# Patient Record
Sex: FEMALE | Race: WHITE | Employment: FULL TIME | URBAN - METROPOLITAN AREA
[De-identification: names, ages, dates, MRNs, and addresses within clinical notes are randomized per-mention and may not be internally consistent; named-entity substitution may affect disease eponyms.]

---

## 2017-01-02 RX ORDER — AMLODIPINE BESYLATE 5 MG/1
5 TABLET ORAL DAILY
Qty: 90 TABLET | Refills: 1 | Status: SHIPPED | OUTPATIENT
Start: 2017-01-02 | End: 2017-06-22 | Stop reason: SDUPTHER

## 2017-01-20 ENCOUNTER — TELEPHONE (OUTPATIENT)
Dept: INTERNAL MEDICINE CLINIC | Age: 52
End: 2017-01-20

## 2017-01-24 ENCOUNTER — TELEPHONE (OUTPATIENT)
Dept: INTERNAL MEDICINE CLINIC | Age: 52
End: 2017-01-24

## 2017-01-26 ENCOUNTER — OFFICE VISIT (OUTPATIENT)
Dept: INTERNAL MEDICINE CLINIC | Age: 52
End: 2017-01-26

## 2017-01-26 VITALS
BODY MASS INDEX: 29.19 KG/M2 | WEIGHT: 192.6 LBS | DIASTOLIC BLOOD PRESSURE: 80 MMHG | RESPIRATION RATE: 16 BRPM | HEIGHT: 68 IN | HEART RATE: 76 BPM | SYSTOLIC BLOOD PRESSURE: 138 MMHG

## 2017-01-26 DIAGNOSIS — R55 SYNCOPE, UNSPECIFIED SYNCOPE TYPE: Primary | ICD-10-CM

## 2017-01-26 PROCEDURE — 99213 OFFICE O/P EST LOW 20 MIN: CPT | Performed by: INTERNAL MEDICINE

## 2017-01-26 PROCEDURE — 93000 ELECTROCARDIOGRAM COMPLETE: CPT | Performed by: INTERNAL MEDICINE

## 2017-02-17 ENCOUNTER — HOSPITAL ENCOUNTER (OUTPATIENT)
Dept: NUCLEAR MEDICINE | Age: 52
Discharge: HOME | End: 2017-02-17
Attending: INTERNAL MEDICINE | Admitting: INTERNAL MEDICINE

## 2017-02-17 DIAGNOSIS — C50.919 MALIGNANT NEOPLASM OF BREAST (FEMALE), UNSPECIFIED SITE: ICD-10-CM

## 2017-02-17 DIAGNOSIS — M25.50 PAIN IN JOINT: ICD-10-CM

## 2017-02-17 RX ORDER — TC 99M MEDRONATE 20 MG/10ML
25 INJECTION, POWDER, LYOPHILIZED, FOR SOLUTION INTRAVENOUS
Status: COMPLETED | OUTPATIENT
Start: 2017-02-17 | End: 2017-02-17

## 2017-02-17 RX ADMIN — TC 99M MEDRONATE 25 MILLICURIE: 20 INJECTION, POWDER, LYOPHILIZED, FOR SOLUTION INTRAVENOUS at 09:18

## 2017-02-22 ENCOUNTER — HOSPITAL ENCOUNTER (OUTPATIENT)
Dept: NON INVASIVE DIAGNOSTICS | Age: 52
Discharge: HOME | End: 2017-02-22
Attending: INTERNAL MEDICINE | Admitting: INTERNAL MEDICINE

## 2017-02-22 DIAGNOSIS — M25.50 PAIN IN JOINT: ICD-10-CM

## 2017-02-22 LAB
LV EF: 55 %
LVEF MODALITY: NORMAL

## 2017-06-19 ENCOUNTER — OFFICE VISIT (OUTPATIENT)
Dept: INTERNAL MEDICINE CLINIC | Age: 52
End: 2017-06-19

## 2017-06-19 VITALS
SYSTOLIC BLOOD PRESSURE: 130 MMHG | DIASTOLIC BLOOD PRESSURE: 90 MMHG | BODY MASS INDEX: 30.31 KG/M2 | WEIGHT: 200 LBS | HEIGHT: 68 IN | OXYGEN SATURATION: 98 % | HEART RATE: 83 BPM

## 2017-06-19 DIAGNOSIS — A09 TRAVELER'S DIARRHEA: Primary | ICD-10-CM

## 2017-06-19 PROCEDURE — 99213 OFFICE O/P EST LOW 20 MIN: CPT | Performed by: INTERNAL MEDICINE

## 2017-06-19 RX ORDER — CIPROFLOXACIN 500 MG/1
500 TABLET, FILM COATED ORAL 2 TIMES DAILY
Qty: 6 TABLET | Refills: 0 | Status: SHIPPED | OUTPATIENT
Start: 2017-06-19 | End: 2017-06-22

## 2017-06-19 ASSESSMENT — ENCOUNTER SYMPTOMS
NAUSEA: 1
DIARRHEA: 1
EYES NEGATIVE: 1
RESPIRATORY NEGATIVE: 1
CONSTIPATION: 0
VOMITING: 0
BLOOD IN STOOL: 0
HEARTBURN: 0
ABDOMINAL PAIN: 1

## 2017-06-22 RX ORDER — AMLODIPINE BESYLATE 5 MG/1
TABLET ORAL
Qty: 90 TABLET | Refills: 0 | Status: SHIPPED | OUTPATIENT
Start: 2017-06-22 | End: 2017-11-07 | Stop reason: SDUPTHER

## 2017-11-07 ENCOUNTER — OFFICE VISIT (OUTPATIENT)
Dept: INTERNAL MEDICINE CLINIC | Age: 52
End: 2017-11-07

## 2017-11-07 VITALS
HEIGHT: 68 IN | DIASTOLIC BLOOD PRESSURE: 82 MMHG | RESPIRATION RATE: 16 BRPM | BODY MASS INDEX: 31.01 KG/M2 | HEART RATE: 82 BPM | SYSTOLIC BLOOD PRESSURE: 122 MMHG | WEIGHT: 204.6 LBS

## 2017-11-07 DIAGNOSIS — I10 ESSENTIAL HYPERTENSION: Primary | ICD-10-CM

## 2017-11-07 DIAGNOSIS — Z23 NEED FOR INFLUENZA VACCINATION: ICD-10-CM

## 2017-11-07 DIAGNOSIS — E78.00 PURE HYPERCHOLESTEROLEMIA: ICD-10-CM

## 2017-11-07 PROCEDURE — 90686 IIV4 VACC NO PRSV 0.5 ML IM: CPT | Performed by: INTERNAL MEDICINE

## 2017-11-07 PROCEDURE — 90471 IMMUNIZATION ADMIN: CPT | Performed by: INTERNAL MEDICINE

## 2017-11-07 PROCEDURE — 99213 OFFICE O/P EST LOW 20 MIN: CPT | Performed by: INTERNAL MEDICINE

## 2017-11-07 RX ORDER — FLUOXETINE HYDROCHLORIDE 20 MG/1
20 CAPSULE ORAL DAILY
Qty: 90 CAPSULE | Refills: 3 | Status: SHIPPED | OUTPATIENT
Start: 2017-11-07 | End: 2018-04-27 | Stop reason: SDUPTHER

## 2017-11-07 RX ORDER — AMLODIPINE BESYLATE 5 MG/1
TABLET ORAL
Qty: 90 TABLET | Refills: 3 | Status: SHIPPED | OUTPATIENT
Start: 2017-11-07 | End: 2018-01-31

## 2017-11-07 RX ORDER — AMLODIPINE BESYLATE 5 MG/1
TABLET ORAL
Qty: 30 TABLET | Refills: 0 | Status: SHIPPED | OUTPATIENT
Start: 2017-11-07 | End: 2017-11-07 | Stop reason: SDUPTHER

## 2017-11-24 ENCOUNTER — TELEPHONE (OUTPATIENT)
Dept: INTERNAL MEDICINE CLINIC | Age: 52
End: 2017-11-24

## 2017-11-24 DIAGNOSIS — I10 ESSENTIAL HYPERTENSION: Primary | ICD-10-CM

## 2017-11-24 DIAGNOSIS — E78.00 PURE HYPERCHOLESTEROLEMIA: ICD-10-CM

## 2017-12-01 LAB
A/G RATIO: 1.8 (ref 1.1–2.2)
ALBUMIN SERPL-MCNC: 4.5 G/DL (ref 3.4–5)
ALP BLD-CCNC: 88 U/L (ref 40–129)
ALT SERPL-CCNC: 25 U/L (ref 10–40)
ANION GAP SERPL CALCULATED.3IONS-SCNC: 13 MMOL/L (ref 3–16)
AST SERPL-CCNC: 16 U/L (ref 15–37)
BILIRUB SERPL-MCNC: 0.4 MG/DL (ref 0–1)
BUN BLDV-MCNC: 11 MG/DL (ref 7–20)
CALCIUM SERPL-MCNC: 9.6 MG/DL (ref 8.3–10.6)
CHLORIDE BLD-SCNC: 103 MMOL/L (ref 99–110)
CHOLESTEROL, TOTAL: 275 MG/DL (ref 0–199)
CO2: 26 MMOL/L (ref 21–32)
CREAT SERPL-MCNC: 0.6 MG/DL (ref 0.6–1.1)
GFR AFRICAN AMERICAN: >60
GFR NON-AFRICAN AMERICAN: >60
GLOBULIN: 2.5 G/DL
GLUCOSE BLD-MCNC: 93 MG/DL (ref 70–99)
HDLC SERPL-MCNC: 68 MG/DL (ref 40–60)
LDL CHOLESTEROL CALCULATED: 184 MG/DL
POTASSIUM SERPL-SCNC: 4.3 MMOL/L (ref 3.5–5.1)
SODIUM BLD-SCNC: 142 MMOL/L (ref 136–145)
TOTAL PROTEIN: 7 G/DL (ref 6.4–8.2)
TRIGL SERPL-MCNC: 116 MG/DL (ref 0–150)
TSH SERPL DL<=0.05 MIU/L-ACNC: 1.26 UIU/ML (ref 0.27–4.2)
VLDLC SERPL CALC-MCNC: 23 MG/DL

## 2018-02-22 RX ORDER — AMLODIPINE BESYLATE 5 MG/1
5 TABLET ORAL DAILY
Qty: 90 TABLET | Refills: 1 | Status: SHIPPED | OUTPATIENT
Start: 2018-02-22 | End: 2018-12-17 | Stop reason: SDUPTHER

## 2018-02-23 ENCOUNTER — OFFICE VISIT (OUTPATIENT)
Dept: INTERNAL MEDICINE CLINIC | Age: 53
End: 2018-02-23

## 2018-02-23 VITALS
TEMPERATURE: 98.8 F | RESPIRATION RATE: 16 BRPM | DIASTOLIC BLOOD PRESSURE: 80 MMHG | WEIGHT: 198.2 LBS | HEART RATE: 100 BPM | HEIGHT: 68 IN | SYSTOLIC BLOOD PRESSURE: 130 MMHG | BODY MASS INDEX: 30.04 KG/M2

## 2018-02-23 DIAGNOSIS — J40 BRONCHITIS: Primary | ICD-10-CM

## 2018-02-23 PROCEDURE — 99213 OFFICE O/P EST LOW 20 MIN: CPT | Performed by: INTERNAL MEDICINE

## 2018-02-23 RX ORDER — AZITHROMYCIN 250 MG/1
TABLET, FILM COATED ORAL
Qty: 1 PACKET | Refills: 0 | Status: SHIPPED | OUTPATIENT
Start: 2018-02-23 | End: 2018-03-05

## 2018-02-23 RX ORDER — GUAIFENESIN AND CODEINE PHOSPHATE 100; 10 MG/5ML; MG/5ML
5-10 SOLUTION ORAL EVERY 4 HOURS PRN
Qty: 180 ML | Refills: 1 | Status: SHIPPED | OUTPATIENT
Start: 2018-02-23 | End: 2018-04-27

## 2018-04-19 ENCOUNTER — TELEPHONE (OUTPATIENT)
Dept: INTERNAL MEDICINE CLINIC | Age: 53
End: 2018-04-19

## 2018-04-27 ENCOUNTER — OFFICE VISIT (OUTPATIENT)
Dept: PSYCHOLOGY | Age: 53
End: 2018-04-27

## 2018-04-27 DIAGNOSIS — F41.9 ANXIETY: Primary | ICD-10-CM

## 2018-04-27 PROCEDURE — 90791 PSYCH DIAGNOSTIC EVALUATION: CPT | Performed by: PSYCHOLOGIST

## 2018-04-27 RX ORDER — FLUOXETINE HYDROCHLORIDE 40 MG/1
40 CAPSULE ORAL DAILY
Qty: 30 CAPSULE | Refills: 1 | Status: SHIPPED | OUTPATIENT
Start: 2018-04-27 | End: 2018-06-27 | Stop reason: SDUPTHER

## 2018-04-27 ASSESSMENT — PATIENT HEALTH QUESTIONNAIRE - PHQ9
3. TROUBLE FALLING OR STAYING ASLEEP: 3
1. LITTLE INTEREST OR PLEASURE IN DOING THINGS: 2
SUM OF ALL RESPONSES TO PHQ9 QUESTIONS 1 & 2: 4
10. IF YOU CHECKED OFF ANY PROBLEMS, HOW DIFFICULT HAVE THESE PROBLEMS MADE IT FOR YOU TO DO YOUR WORK, TAKE CARE OF THINGS AT HOME, OR GET ALONG WITH OTHER PEOPLE: 1
8. MOVING OR SPEAKING SO SLOWLY THAT OTHER PEOPLE COULD HAVE NOTICED. OR THE OPPOSITE, BEING SO FIGETY OR RESTLESS THAT YOU HAVE BEEN MOVING AROUND A LOT MORE THAN USUAL: 0
2. FEELING DOWN, DEPRESSED OR HOPELESS: 2
5. POOR APPETITE OR OVEREATING: 1
6. FEELING BAD ABOUT YOURSELF - OR THAT YOU ARE A FAILURE OR HAVE LET YOURSELF OR YOUR FAMILY DOWN: 3
4. FEELING TIRED OR HAVING LITTLE ENERGY: 3
SUM OF ALL RESPONSES TO PHQ QUESTIONS 1-9: 16
9. THOUGHTS THAT YOU WOULD BE BETTER OFF DEAD, OR OF HURTING YOURSELF: 0
7. TROUBLE CONCENTRATING ON THINGS, SUCH AS READING THE NEWSPAPER OR WATCHING TELEVISION: 2

## 2018-04-27 ASSESSMENT — ANXIETY QUESTIONNAIRES
2. NOT BEING ABLE TO STOP OR CONTROL WORRYING: 3-NEARLY EVERY DAY
7. FEELING AFRAID AS IF SOMETHING AWFUL MIGHT HAPPEN: 1-SEVERAL DAYS
4. TROUBLE RELAXING: 3-NEARLY EVERY DAY
1. FEELING NERVOUS, ANXIOUS, OR ON EDGE: 3-NEARLY EVERY DAY
6. BECOMING EASILY ANNOYED OR IRRITABLE: 1-SEVERAL DAYS
3. WORRYING TOO MUCH ABOUT DIFFERENT THINGS: 3-NEARLY EVERY DAY
5. BEING SO RESTLESS THAT IT IS HARD TO SIT STILL: 1-SEVERAL DAYS
GAD7 TOTAL SCORE: 15

## 2018-05-01 ENCOUNTER — OFFICE VISIT (OUTPATIENT)
Dept: PSYCHOLOGY | Age: 53
End: 2018-05-01

## 2018-05-01 DIAGNOSIS — F41.9 ANXIETY: Primary | ICD-10-CM

## 2018-05-01 PROCEDURE — 90832 PSYTX W PT 30 MINUTES: CPT | Performed by: PSYCHOLOGIST

## 2018-05-01 ASSESSMENT — ANXIETY QUESTIONNAIRES
6. BECOMING EASILY ANNOYED OR IRRITABLE: 2-OVER HALF THE DAYS
3. WORRYING TOO MUCH ABOUT DIFFERENT THINGS: 3-NEARLY EVERY DAY
7. FEELING AFRAID AS IF SOMETHING AWFUL MIGHT HAPPEN: 1-SEVERAL DAYS
1. FEELING NERVOUS, ANXIOUS, OR ON EDGE: 2-OVER HALF THE DAYS
4. TROUBLE RELAXING: 3-NEARLY EVERY DAY
2. NOT BEING ABLE TO STOP OR CONTROL WORRYING: 3-NEARLY EVERY DAY
GAD7 TOTAL SCORE: 15
5. BEING SO RESTLESS THAT IT IS HARD TO SIT STILL: 1-SEVERAL DAYS

## 2018-05-01 ASSESSMENT — PATIENT HEALTH QUESTIONNAIRE - PHQ9
7. TROUBLE CONCENTRATING ON THINGS, SUCH AS READING THE NEWSPAPER OR WATCHING TELEVISION: 1
10. IF YOU CHECKED OFF ANY PROBLEMS, HOW DIFFICULT HAVE THESE PROBLEMS MADE IT FOR YOU TO DO YOUR WORK, TAKE CARE OF THINGS AT HOME, OR GET ALONG WITH OTHER PEOPLE: 1
9. THOUGHTS THAT YOU WOULD BE BETTER OFF DEAD, OR OF HURTING YOURSELF: 0
1. LITTLE INTEREST OR PLEASURE IN DOING THINGS: 2
SUM OF ALL RESPONSES TO PHQ9 QUESTIONS 1 & 2: 4
6. FEELING BAD ABOUT YOURSELF - OR THAT YOU ARE A FAILURE OR HAVE LET YOURSELF OR YOUR FAMILY DOWN: 2
SUM OF ALL RESPONSES TO PHQ QUESTIONS 1-9: 11
2. FEELING DOWN, DEPRESSED OR HOPELESS: 2
4. FEELING TIRED OR HAVING LITTLE ENERGY: 1
3. TROUBLE FALLING OR STAYING ASLEEP: 2
5. POOR APPETITE OR OVEREATING: 1
8. MOVING OR SPEAKING SO SLOWLY THAT OTHER PEOPLE COULD HAVE NOTICED. OR THE OPPOSITE, BEING SO FIGETY OR RESTLESS THAT YOU HAVE BEEN MOVING AROUND A LOT MORE THAN USUAL: 0

## 2018-05-18 ENCOUNTER — OFFICE VISIT (OUTPATIENT)
Dept: PSYCHOLOGY | Age: 53
End: 2018-05-18

## 2018-05-18 DIAGNOSIS — F34.1 PERSISTENT DEPRESSIVE DISORDER: ICD-10-CM

## 2018-05-18 DIAGNOSIS — F41.1 GAD (GENERALIZED ANXIETY DISORDER): Primary | ICD-10-CM

## 2018-05-18 PROCEDURE — 90834 PSYTX W PT 45 MINUTES: CPT | Performed by: PSYCHOLOGIST

## 2018-05-18 ASSESSMENT — ANXIETY QUESTIONNAIRES
6. BECOMING EASILY ANNOYED OR IRRITABLE: 1-SEVERAL DAYS
1. FEELING NERVOUS, ANXIOUS, OR ON EDGE: 2-OVER HALF THE DAYS
5. BEING SO RESTLESS THAT IT IS HARD TO SIT STILL: 0-NOT AT ALL SURE
7. FEELING AFRAID AS IF SOMETHING AWFUL MIGHT HAPPEN: 1-SEVERAL DAYS
GAD7 TOTAL SCORE: 12
4. TROUBLE RELAXING: 2-OVER HALF THE DAYS
3. WORRYING TOO MUCH ABOUT DIFFERENT THINGS: 3-NEARLY EVERY DAY
2. NOT BEING ABLE TO STOP OR CONTROL WORRYING: 3-NEARLY EVERY DAY

## 2018-05-18 ASSESSMENT — PATIENT HEALTH QUESTIONNAIRE - PHQ9
10. IF YOU CHECKED OFF ANY PROBLEMS, HOW DIFFICULT HAVE THESE PROBLEMS MADE IT FOR YOU TO DO YOUR WORK, TAKE CARE OF THINGS AT HOME, OR GET ALONG WITH OTHER PEOPLE: 1
8. MOVING OR SPEAKING SO SLOWLY THAT OTHER PEOPLE COULD HAVE NOTICED. OR THE OPPOSITE, BEING SO FIGETY OR RESTLESS THAT YOU HAVE BEEN MOVING AROUND A LOT MORE THAN USUAL: 0
SUM OF ALL RESPONSES TO PHQ9 QUESTIONS 1 & 2: 2
SUM OF ALL RESPONSES TO PHQ QUESTIONS 1-9: 9
4. FEELING TIRED OR HAVING LITTLE ENERGY: 2
5. POOR APPETITE OR OVEREATING: 1
2. FEELING DOWN, DEPRESSED OR HOPELESS: 1
1. LITTLE INTEREST OR PLEASURE IN DOING THINGS: 1
3. TROUBLE FALLING OR STAYING ASLEEP: 2
6. FEELING BAD ABOUT YOURSELF - OR THAT YOU ARE A FAILURE OR HAVE LET YOURSELF OR YOUR FAMILY DOWN: 1
7. TROUBLE CONCENTRATING ON THINGS, SUCH AS READING THE NEWSPAPER OR WATCHING TELEVISION: 1
9. THOUGHTS THAT YOU WOULD BE BETTER OFF DEAD, OR OF HURTING YOURSELF: 0

## 2018-05-22 ENCOUNTER — OFFICE VISIT (OUTPATIENT)
Dept: PSYCHOLOGY | Age: 53
End: 2018-05-22

## 2018-05-22 DIAGNOSIS — F41.1 GAD (GENERALIZED ANXIETY DISORDER): Primary | ICD-10-CM

## 2018-05-22 PROCEDURE — 90832 PSYTX W PT 30 MINUTES: CPT | Performed by: PSYCHOLOGIST

## 2018-05-22 ASSESSMENT — ANXIETY QUESTIONNAIRES
3. WORRYING TOO MUCH ABOUT DIFFERENT THINGS: 3-NEARLY EVERY DAY
2. NOT BEING ABLE TO STOP OR CONTROL WORRYING: 2-OVER HALF THE DAYS
1. FEELING NERVOUS, ANXIOUS, OR ON EDGE: 1-SEVERAL DAYS
5. BEING SO RESTLESS THAT IT IS HARD TO SIT STILL: 1-SEVERAL DAYS
6. BECOMING EASILY ANNOYED OR IRRITABLE: 1-SEVERAL DAYS
GAD7 TOTAL SCORE: 10
4. TROUBLE RELAXING: 1-SEVERAL DAYS
7. FEELING AFRAID AS IF SOMETHING AWFUL MIGHT HAPPEN: 1-SEVERAL DAYS

## 2018-05-22 ASSESSMENT — PATIENT HEALTH QUESTIONNAIRE - PHQ9
SUM OF ALL RESPONSES TO PHQ9 QUESTIONS 1 & 2: 2
1. LITTLE INTEREST OR PLEASURE IN DOING THINGS: 1
2. FEELING DOWN, DEPRESSED OR HOPELESS: 1
SUM OF ALL RESPONSES TO PHQ QUESTIONS 1-9: 2

## 2018-06-05 ENCOUNTER — OFFICE VISIT (OUTPATIENT)
Dept: PSYCHOLOGY | Age: 53
End: 2018-06-05

## 2018-06-05 DIAGNOSIS — F41.1 GAD (GENERALIZED ANXIETY DISORDER): Primary | ICD-10-CM

## 2018-06-05 PROCEDURE — 90837 PSYTX W PT 60 MINUTES: CPT | Performed by: PSYCHOLOGIST

## 2018-06-05 ASSESSMENT — PATIENT HEALTH QUESTIONNAIRE - PHQ9
2. FEELING DOWN, DEPRESSED OR HOPELESS: 1
SUM OF ALL RESPONSES TO PHQ9 QUESTIONS 1 & 2: 2
SUM OF ALL RESPONSES TO PHQ QUESTIONS 1-9: 2
1. LITTLE INTEREST OR PLEASURE IN DOING THINGS: 1

## 2018-06-05 ASSESSMENT — ANXIETY QUESTIONNAIRES
1. FEELING NERVOUS, ANXIOUS, OR ON EDGE: 1-SEVERAL DAYS
4. TROUBLE RELAXING: 1-SEVERAL DAYS
5. BEING SO RESTLESS THAT IT IS HARD TO SIT STILL: 0-NOT AT ALL SURE
GAD7 TOTAL SCORE: 6
3. WORRYING TOO MUCH ABOUT DIFFERENT THINGS: 2-OVER HALF THE DAYS
7. FEELING AFRAID AS IF SOMETHING AWFUL MIGHT HAPPEN: 0-NOT AT ALL SURE
6. BECOMING EASILY ANNOYED OR IRRITABLE: 0-NOT AT ALL SURE
2. NOT BEING ABLE TO STOP OR CONTROL WORRYING: 2-OVER HALF THE DAYS

## 2018-06-22 ENCOUNTER — OFFICE VISIT (OUTPATIENT)
Dept: PSYCHOLOGY | Age: 53
End: 2018-06-22

## 2018-06-22 DIAGNOSIS — F41.1 GAD (GENERALIZED ANXIETY DISORDER): Primary | ICD-10-CM

## 2018-06-22 PROCEDURE — 90832 PSYTX W PT 30 MINUTES: CPT | Performed by: PSYCHOLOGIST

## 2018-06-22 ASSESSMENT — PATIENT HEALTH QUESTIONNAIRE - PHQ9
SUM OF ALL RESPONSES TO PHQ9 QUESTIONS 1 & 2: 5
10. IF YOU CHECKED OFF ANY PROBLEMS, HOW DIFFICULT HAVE THESE PROBLEMS MADE IT FOR YOU TO DO YOUR WORK, TAKE CARE OF THINGS AT HOME, OR GET ALONG WITH OTHER PEOPLE: 2
6. FEELING BAD ABOUT YOURSELF - OR THAT YOU ARE A FAILURE OR HAVE LET YOURSELF OR YOUR FAMILY DOWN: 2
3. TROUBLE FALLING OR STAYING ASLEEP: 2
7. TROUBLE CONCENTRATING ON THINGS, SUCH AS READING THE NEWSPAPER OR WATCHING TELEVISION: 2
SUM OF ALL RESPONSES TO PHQ QUESTIONS 1-9: 17
4. FEELING TIRED OR HAVING LITTLE ENERGY: 3
8. MOVING OR SPEAKING SO SLOWLY THAT OTHER PEOPLE COULD HAVE NOTICED. OR THE OPPOSITE, BEING SO FIGETY OR RESTLESS THAT YOU HAVE BEEN MOVING AROUND A LOT MORE THAN USUAL: 1
9. THOUGHTS THAT YOU WOULD BE BETTER OFF DEAD, OR OF HURTING YOURSELF: 0
2. FEELING DOWN, DEPRESSED OR HOPELESS: 3
5. POOR APPETITE OR OVEREATING: 2
1. LITTLE INTEREST OR PLEASURE IN DOING THINGS: 2

## 2018-06-22 ASSESSMENT — ANXIETY QUESTIONNAIRES
7. FEELING AFRAID AS IF SOMETHING AWFUL MIGHT HAPPEN: 1-SEVERAL DAYS
5. BEING SO RESTLESS THAT IT IS HARD TO SIT STILL: 2-OVER HALF THE DAYS
2. NOT BEING ABLE TO STOP OR CONTROL WORRYING: 3-NEARLY EVERY DAY
4. TROUBLE RELAXING: 3-NEARLY EVERY DAY
6. BECOMING EASILY ANNOYED OR IRRITABLE: 1-SEVERAL DAYS
3. WORRYING TOO MUCH ABOUT DIFFERENT THINGS: 3-NEARLY EVERY DAY
1. FEELING NERVOUS, ANXIOUS, OR ON EDGE: 3-NEARLY EVERY DAY
GAD7 TOTAL SCORE: 16

## 2018-06-25 ENCOUNTER — OFFICE VISIT (OUTPATIENT)
Dept: PSYCHOLOGY | Age: 53
End: 2018-06-25

## 2018-06-25 DIAGNOSIS — F41.1 GAD (GENERALIZED ANXIETY DISORDER): Primary | ICD-10-CM

## 2018-06-25 PROCEDURE — 90832 PSYTX W PT 30 MINUTES: CPT | Performed by: PSYCHOLOGIST

## 2018-06-27 RX ORDER — FLUOXETINE HYDROCHLORIDE 40 MG/1
CAPSULE ORAL
Qty: 30 CAPSULE | Refills: 0 | Status: SHIPPED | OUTPATIENT
Start: 2018-06-27 | End: 2018-07-26 | Stop reason: SDUPTHER

## 2018-07-02 ENCOUNTER — OFFICE VISIT (OUTPATIENT)
Dept: PSYCHOLOGY | Age: 53
End: 2018-07-02

## 2018-07-02 ENCOUNTER — OFFICE VISIT (OUTPATIENT)
Dept: INTERNAL MEDICINE CLINIC | Age: 53
End: 2018-07-02

## 2018-07-02 VITALS
WEIGHT: 196.8 LBS | RESPIRATION RATE: 16 BRPM | HEART RATE: 80 BPM | BODY MASS INDEX: 29.83 KG/M2 | OXYGEN SATURATION: 98 % | DIASTOLIC BLOOD PRESSURE: 72 MMHG | SYSTOLIC BLOOD PRESSURE: 124 MMHG | HEIGHT: 68 IN

## 2018-07-02 DIAGNOSIS — I10 ESSENTIAL HYPERTENSION: Primary | ICD-10-CM

## 2018-07-02 DIAGNOSIS — F41.1 GAD (GENERALIZED ANXIETY DISORDER): Primary | ICD-10-CM

## 2018-07-02 PROCEDURE — 90832 PSYTX W PT 30 MINUTES: CPT | Performed by: PSYCHOLOGIST

## 2018-07-02 PROCEDURE — 99213 OFFICE O/P EST LOW 20 MIN: CPT | Performed by: INTERNAL MEDICINE

## 2018-07-02 ASSESSMENT — ANXIETY QUESTIONNAIRES
6. BECOMING EASILY ANNOYED OR IRRITABLE: 1-SEVERAL DAYS
2. NOT BEING ABLE TO STOP OR CONTROL WORRYING: 2-OVER HALF THE DAYS
GAD7 TOTAL SCORE: 13
4. TROUBLE RELAXING: 3-NEARLY EVERY DAY
1. FEELING NERVOUS, ANXIOUS, OR ON EDGE: 2-OVER HALF THE DAYS
3. WORRYING TOO MUCH ABOUT DIFFERENT THINGS: 3-NEARLY EVERY DAY
7. FEELING AFRAID AS IF SOMETHING AWFUL MIGHT HAPPEN: 0-NOT AT ALL SURE
5. BEING SO RESTLESS THAT IT IS HARD TO SIT STILL: 2-OVER HALF THE DAYS

## 2018-07-02 ASSESSMENT — PATIENT HEALTH QUESTIONNAIRE - PHQ9
SUM OF ALL RESPONSES TO PHQ9 QUESTIONS 1 & 2: 3
2. FEELING DOWN, DEPRESSED OR HOPELESS: 2
5. POOR APPETITE OR OVEREATING: 1
3. TROUBLE FALLING OR STAYING ASLEEP: 2
1. LITTLE INTEREST OR PLEASURE IN DOING THINGS: 1
7. TROUBLE CONCENTRATING ON THINGS, SUCH AS READING THE NEWSPAPER OR WATCHING TELEVISION: 0
4. FEELING TIRED OR HAVING LITTLE ENERGY: 3
8. MOVING OR SPEAKING SO SLOWLY THAT OTHER PEOPLE COULD HAVE NOTICED. OR THE OPPOSITE, BEING SO FIGETY OR RESTLESS THAT YOU HAVE BEEN MOVING AROUND A LOT MORE THAN USUAL: 0
10. IF YOU CHECKED OFF ANY PROBLEMS, HOW DIFFICULT HAVE THESE PROBLEMS MADE IT FOR YOU TO DO YOUR WORK, TAKE CARE OF THINGS AT HOME, OR GET ALONG WITH OTHER PEOPLE: 1
6. FEELING BAD ABOUT YOURSELF - OR THAT YOU ARE A FAILURE OR HAVE LET YOURSELF OR YOUR FAMILY DOWN: 1
SUM OF ALL RESPONSES TO PHQ QUESTIONS 1-9: 10
9. THOUGHTS THAT YOU WOULD BE BETTER OFF DEAD, OR OF HURTING YOURSELF: 0

## 2018-07-02 NOTE — PROGRESS NOTES
suicidal ideation      History:    Medications:   Current Outpatient Prescriptions   Medication Sig Dispense Refill    FLUoxetine (PROZAC) 40 MG capsule TAKE ONE CAPSULE BY MOUTH DAILY 30 capsule 0    amLODIPine (NORVASC) 5 MG tablet Take 1 tablet by mouth daily 90 tablet 1    VENTOLIN  (90 Base) MCG/ACT inhaler INHALE TWO PUFFS BY MOUTH EVERY 4 HOURS AS NEEDED FOR WHEEZING 1 Inhaler 4    Cholecalciferol (VITAMIN D3) 2000 UNITS CAPS Take 2,000 Units by mouth three times a week      Nutritional Supplements (JUICE PLUS FIBRE PO) Take 1.5 g by mouth daily Indications: Takes Juice Plus      M[place] Mushroom POWD 500 mg by Does not apply route daily      aspirin-acetaminophen-caffeine (EXCEDRIN MIGRAINE) 250-250-65 MG per tablet Take 1 tablet by mouth every 6 hours as needed for Headaches      cetirizine (ZYRTEC) 10 MG tablet Take 10 mg by mouth daily      letrozole (FEMARA) 2.5 MG tablet Take 2.5 mg by mouth daily. 4 times a week      ALPRAZolam (XANAX) 0.5 MG tablet Take 0.5 mg by mouth as needed. No current facility-administered medications for this visit. Social History:   Social History     Social History    Marital status:      Spouse name: N/A    Number of children: 0    Years of education: N/A     Occupational History    Not on file. Social History Main Topics    Smoking status: Never Smoker    Smokeless tobacco: Never Used    Alcohol use Yes      Comment: wine on weekends only    Drug use: No    Sexual activity: Not on file     Other Topics Concern    Not on file     Social History Narrative    No narrative on file       TOBACCO:   reports that she has never smoked. She has never used smokeless tobacco.  ETOH:   reports that she drinks alcohol.     Family History:   Family History   Problem Relation Age of Onset    Other Mother         MVP, Panic Disorder    Coronary Art Dis Father     High Blood Pressure Father     Heart Disease Father 36        MI   Rena Oar Diabetes Father 68        Type 2    Cancer Brother         Melanoma    High Blood Pressure Brother     Cancer Sister         Melanoma in situ    High Blood Pressure Sister     Colon Cancer Paternal Grandmother        A:    See S: above. Stress levels down a bit (see PHQ-9 and BLANCA-7 scores below). Depression levels down from 17 to 10 in the low moderate range. Anxiety levels down from 16 (severe) to 13 in the moderate range. We continue to focus on stress management and the importance of being assertive with  in the house moving process and scheduling fun for herself. Still has plans to go to [facility] with  after closing of house on 7/13. Predicted how grief may worsen once she moves out of home she shared with  and starts living with her mother and step-father. F/u with me next week. PHQ Scores 7/2/2018 6/22/2018 6/5/2018 5/22/2018 5/18/2018 5/1/2018 4/27/2018   PHQ2 Score 3 5 2 2 2 4 4   PHQ9 Score 10 17 2 2 9 11 16     Interpretation of Total Score Depression Severity: 1-4 = Minimal depression, 5-9 = Mild depression, 10-14 = Moderate depression, 15-19 = Moderately severe depression, 20-27 = Severe depression    BLANCA 7 SCORE 7/2/2018 6/22/2018 6/5/2018 5/22/2018 5/18/2018 5/1/2018 4/27/2018   BLANCA-7 Total Score 13 16 6 10 12 15 15     Interpretation of BLANCA-7 score: 5-9 = mild anxiety, 10-14 = moderate anxiety, 15+ = severe anxiety. Recommend referral to behavioral health for scores 10 or greater.     Diagnosis:    Generalized anxiety disorder      Diagnosis Date    Allergic rhinitis     Asthma     Breast cancer ([facility] [address] 11/2012    [facility] of blood clots 01/2013    Lungs    Miscarriage 2004    Premenstrual dysphoric syndrome     Pulmonary embolus ([facility] [address] 1/2013     Problems with primary support group    Plan:  Pt interventions:    Practiced assertive communication, Trained in strategies for increasing balanced thinking, Discussed self-care (sleep, nutrition, rewarding activities, social

## 2018-07-09 ENCOUNTER — OFFICE VISIT (OUTPATIENT)
Dept: PSYCHOLOGY | Age: 53
End: 2018-07-09

## 2018-07-09 DIAGNOSIS — F41.1 GAD (GENERALIZED ANXIETY DISORDER): Primary | ICD-10-CM

## 2018-07-09 PROCEDURE — 90832 PSYTX W PT 30 MINUTES: CPT | Performed by: PSYCHOLOGIST

## 2018-07-09 ASSESSMENT — ANXIETY QUESTIONNAIRES
1. FEELING NERVOUS, ANXIOUS, OR ON EDGE: 3-NEARLY EVERY DAY
5. BEING SO RESTLESS THAT IT IS HARD TO SIT STILL: 3-NEARLY EVERY DAY
GAD7 TOTAL SCORE: 16
6. BECOMING EASILY ANNOYED OR IRRITABLE: 1-SEVERAL DAYS
7. FEELING AFRAID AS IF SOMETHING AWFUL MIGHT HAPPEN: 0-NOT AT ALL SURE
4. TROUBLE RELAXING: 3-NEARLY EVERY DAY
3. WORRYING TOO MUCH ABOUT DIFFERENT THINGS: 3-NEARLY EVERY DAY
2. NOT BEING ABLE TO STOP OR CONTROL WORRYING: 3-NEARLY EVERY DAY

## 2018-07-09 ASSESSMENT — PATIENT HEALTH QUESTIONNAIRE - PHQ9
4. FEELING TIRED OR HAVING LITTLE ENERGY: 3
6. FEELING BAD ABOUT YOURSELF - OR THAT YOU ARE A FAILURE OR HAVE LET YOURSELF OR YOUR FAMILY DOWN: 0
1. LITTLE INTEREST OR PLEASURE IN DOING THINGS: 2
5. POOR APPETITE OR OVEREATING: 3
7. TROUBLE CONCENTRATING ON THINGS, SUCH AS READING THE NEWSPAPER OR WATCHING TELEVISION: 0
SUM OF ALL RESPONSES TO PHQ QUESTIONS 1-9: 14
8. MOVING OR SPEAKING SO SLOWLY THAT OTHER PEOPLE COULD HAVE NOTICED. OR THE OPPOSITE, BEING SO FIGETY OR RESTLESS THAT YOU HAVE BEEN MOVING AROUND A LOT MORE THAN USUAL: 2
SUM OF ALL RESPONSES TO PHQ9 QUESTIONS 1 & 2: 3
3. TROUBLE FALLING OR STAYING ASLEEP: 3
10. IF YOU CHECKED OFF ANY PROBLEMS, HOW DIFFICULT HAVE THESE PROBLEMS MADE IT FOR YOU TO DO YOUR WORK, TAKE CARE OF THINGS AT HOME, OR GET ALONG WITH OTHER PEOPLE: 1
2. FEELING DOWN, DEPRESSED OR HOPELESS: 1
9. THOUGHTS THAT YOU WOULD BE BETTER OFF DEAD, OR OF HURTING YOURSELF: 0

## 2018-07-09 NOTE — PROGRESS NOTES
Behavioral Health Consultation Follow-up  Dominik Walker PsyD  Psychologist  7/9/2018  9:38 AM       Time spent with Patient: 30 minutes  This is patient's eighth  [facility] appointment. Reason for Consult:  BLANCA  Referring Provider: Nadira Castillo MD  [address] [facility], [address] [facility]    Feedback given to PCP. S:    Last appt: 7/2. Stress continues as she moves through divorce process and closing on the house. A bit of struggle with asking sister to help her move things with sister battling her own health issues. Explored how letting her sister help her can give her a sense of sister \"normalcy\" and how pt's guilt is getting in the way of this.      O:  MSE:    Appearance    Teary eyed at times, alert, cooperative  Appetite okay  Sleep disturbance Yes  Fatigue Yes  Loss of pleasure Yes  Impulsive behavior No  Speech    normal rate, normal volume and well articulated  Mood    Stressed about divorce process  Affect    Congruent with full range  Thought Content    intact  Thought Process    linear, goal directed and coherent  Associations    logical connections  Insight    Good  Judgment    Intact  Orientation    oriented to person, place, time, and general circumstances  Memory    recent and remote memory intact  Attention/Concentration    intact  Morbid ideation No  Suicide Assessment    no suicidal ideation      History:    Medications:   Current Outpatient Prescriptions   Medication Sig Dispense Refill    FLUoxetine (PROZAC) 40 MG capsule TAKE ONE CAPSULE BY MOUTH DAILY 30 capsule 0    amLODIPine (NORVASC) 5 MG tablet Take 1 tablet by mouth daily 90 tablet 1    VENTOLIN  (90 Base) MCG/ACT inhaler INHALE TWO PUFFS BY MOUTH EVERY 4 HOURS AS NEEDED FOR WHEEZING 1 Inhaler 4    Cholecalciferol (VITAMIN D3) 2000 UNITS CAPS Take 2,000 Units by mouth three times a week      Nutritional Supplements (JUICE PLUS FIBRE PO) Take 1.5 g by mouth daily Indications: Takes Juice Plus      Maitake Mushroom 7 SCORE 7/9/2018 7/2/2018 6/22/2018 6/5/2018 5/22/2018 5/18/2018 5/1/2018   BLANCA-7 Total Score 16 13 16 6 10 12 15     Interpretation of BLANCA-7 score: 5-9 = mild anxiety, 10-14 = moderate anxiety, 15+ = severe anxiety. Recommend referral to behavioral health for scores 10 or greater. Diagnosis:    BLANCA      Diagnosis Date    Allergic rhinitis     Asthma     Breast cancer ([facility] [address] 11/2012    [place]    Hx of blood clots 01/2013    Lungs    Miscarriage 2004    Premenstrual dysphoric syndrome     Pulmonary embolus ([facility] [address] 1/2013     Problems with primary support group      Plan:  Pt interventions:    Practiced assertive communication, Trained in strategies for increasing balanced thinking, Discussed self-care (sleep, nutrition, rewarding activities, social support, exercise), Discussed and problem-solved barriers in adhering to behavioral change plan, Supportive techniques and CBT to target excessive guilt getting in way of self-care. Pt Behavioral Change Plan:    1. Continue to ask friends to help move items out of the house before closing on 7/15  2. Continue to take medication as prescribed  3. Buy air line ticket to [facility] for trip after closing of home for fun and relaxation  4. Consider getting back to yoga class for stress reduction  5. Continue to slow down, take breaks as needed, schedule fun with friends during the moving process for stress reduction  6. Let sister help you today with the house  7.  Return to clinic for Dr. Christ Suarez next week

## 2018-07-16 ENCOUNTER — OFFICE VISIT (OUTPATIENT)
Dept: PSYCHOLOGY | Age: 53
End: 2018-07-16

## 2018-07-16 DIAGNOSIS — F41.1 GAD (GENERALIZED ANXIETY DISORDER): Primary | ICD-10-CM

## 2018-07-16 PROCEDURE — 90832 PSYTX W PT 30 MINUTES: CPT | Performed by: PSYCHOLOGIST

## 2018-07-16 ASSESSMENT — ANXIETY QUESTIONNAIRES
5. BEING SO RESTLESS THAT IT IS HARD TO SIT STILL: 0-NOT AT ALL SURE
6. BECOMING EASILY ANNOYED OR IRRITABLE: 1-SEVERAL DAYS
4. TROUBLE RELAXING: 1-SEVERAL DAYS
7. FEELING AFRAID AS IF SOMETHING AWFUL MIGHT HAPPEN: 0-NOT AT ALL SURE
2. NOT BEING ABLE TO STOP OR CONTROL WORRYING: 1-SEVERAL DAYS
3. WORRYING TOO MUCH ABOUT DIFFERENT THINGS: 1-SEVERAL DAYS
GAD7 TOTAL SCORE: 5
1. FEELING NERVOUS, ANXIOUS, OR ON EDGE: 1-SEVERAL DAYS

## 2018-07-24 ENCOUNTER — OFFICE VISIT (OUTPATIENT)
Dept: PSYCHOLOGY | Age: 53
End: 2018-07-24

## 2018-07-24 DIAGNOSIS — F41.1 GAD (GENERALIZED ANXIETY DISORDER): Primary | ICD-10-CM

## 2018-07-24 PROCEDURE — 90832 PSYTX W PT 30 MINUTES: CPT | Performed by: PSYCHOLOGIST

## 2018-07-24 ASSESSMENT — ANXIETY QUESTIONNAIRES
7. FEELING AFRAID AS IF SOMETHING AWFUL MIGHT HAPPEN: 0-NOT AT ALL SURE
4. TROUBLE RELAXING: 2-OVER HALF THE DAYS
5. BEING SO RESTLESS THAT IT IS HARD TO SIT STILL: 0-NOT AT ALL SURE
2. NOT BEING ABLE TO STOP OR CONTROL WORRYING: 3-NEARLY EVERY DAY
GAD7 TOTAL SCORE: 12
3. WORRYING TOO MUCH ABOUT DIFFERENT THINGS: 3-NEARLY EVERY DAY
6. BECOMING EASILY ANNOYED OR IRRITABLE: 1-SEVERAL DAYS
1. FEELING NERVOUS, ANXIOUS, OR ON EDGE: 3-NEARLY EVERY DAY

## 2018-07-24 ASSESSMENT — PATIENT HEALTH QUESTIONNAIRE - PHQ9
1. LITTLE INTEREST OR PLEASURE IN DOING THINGS: 0
SUM OF ALL RESPONSES TO PHQ QUESTIONS 1-9: 1
SUM OF ALL RESPONSES TO PHQ9 QUESTIONS 1 & 2: 1
2. FEELING DOWN, DEPRESSED OR HOPELESS: 1

## 2018-07-24 NOTE — PATIENT INSTRUCTIONS
1. Continue to take medication as prescribed  2. Increase physical exercise 1-2 x per week, any amount  3. Continue to slow down, take breaks as needed, schedule fun with friends   4. Continue to maintain emotional boundaries with , use assertive communication skills  5. Have  review divorce documents  sent you via email  6.  Return to clinic for Dr. Justyna Beverly next week

## 2018-07-31 ENCOUNTER — OFFICE VISIT (OUTPATIENT)
Dept: PSYCHOLOGY | Age: 53
End: 2018-07-31

## 2018-07-31 DIAGNOSIS — F41.1 GAD (GENERALIZED ANXIETY DISORDER): Primary | ICD-10-CM

## 2018-07-31 PROCEDURE — 90832 PSYTX W PT 30 MINUTES: CPT | Performed by: PSYCHOLOGIST

## 2018-07-31 NOTE — PROGRESS NOTES
Behavioral Health Consultation Follow-up  Melody Molina PsyD  Psychologist  7/31/2018  3:16 PM      Time spent with Patient: 30 minutes  This is patient's 11 th  [facility] appointment. Reason for Consult:  BLANCA  Referring Provider: Andrés Watkins MD  [address] [facility], [address] [facility]    Feedback given to PCP. S:    Last appt: 7/24. School starts on 8/15. Have workshop for 2 days, starts tomorrow. Agreed a good distraction. Sister going to help pt set up classroom this Friday. Focused on how to keep boundaries around how much she shares at school when others ask how her summer has gone. Prepared a script for her to use and agreed going to be important to still have supportive plan at school to take breaks as needed. Will reach out to teaching partner, teaching aid, and principal about this.      O:  MSE:    Appearance    alert, cooperative  Appetite normal  Sleep disturbance Yes  Fatigue Yes  Loss of pleasure better  Impulsive behavior No  Speech    normal rate, normal volume and well articulated  Mood    A bit nervous about school starting  Affect    Congruent with full range  Thought Content    intact  Thought Process    linear, goal directed and coherent  Associations    logical connections  Insight    Good  Judgment    Intact  Orientation    oriented to person, place, time, and general circumstances  Memory    recent and remote memory intact  Attention/Concentration    intact  Morbid ideation no  Suicide Assessment    no suicidal ideation    History:    Medications:   Current Outpatient Prescriptions   Medication Sig Dispense Refill    FLUoxetine (PROZAC) 40 MG capsule TAKE ONE CAPSULE BY MOUTH DAILY 30 capsule 11    amLODIPine (NORVASC) 5 MG tablet Take 1 tablet by mouth daily 90 tablet 1    VENTOLIN  (90 Base) MCG/ACT inhaler INHALE TWO PUFFS BY MOUTH EVERY 4 HOURS AS NEEDED FOR WHEEZING 1 Inhaler 4    Cholecalciferol (VITAMIN D3) 2000 UNITS CAPS Take 2,000 Units by mouth three times Minimal depression, 5-9 = Mild depression, 10-14 = Moderate depression, 15-19 = Moderately severe depression, 20-27 = Severe depression    Diagnosis:    BLANCA      Diagnosis Date    Allergic rhinitis     Asthma     Breast cancer ([facility] [address] 11/2012    [place]    Hx of blood clots 01/2013    Lungs    Miscarriage 2004    Premenstrual dysphoric syndrome     Pulmonary embolus ([facility] [address] 1/2013     Problems with primary support group, Problems related to the social environment and Occupational problems      Plan:  Pt interventions:    Practiced assertive communication, Trained in strategies for increasing balanced thinking, Discussed self-care (sleep, nutrition, rewarding activities, social support, exercise), Discussed and problem-solved barriers in adhering to behavioral change plan and Supportive techniques    Pt Behavioral Change Plan:    1. Continue to take medication as prescribed  2. Talk to principal about possible need to take occasional day off from work as needed  3. Continue to slow down, take breaks as needed, talk with teaching partner and aid to set up \"take a break\" plan  4. Continue to maintain emotional boundaries with , use assertive communication skills  5. Have  review divorce documents  sent you via email  6. Use your script when someone asks, Lucio Loges was your summer? \", broken record, \"thank you for asking, but I don't want talk about it right now\" and repeat, and repeat again   7.  Return to clinic for Dr. Antonio Barroso on 8/13

## 2018-07-31 NOTE — PATIENT INSTRUCTIONS
1. Continue to take medication as prescribed  2. Talk to principal about possible need to take occasional day off from work as needed  3. Continue to slow down, take breaks as needed, talk with teaching partner and aid to set up \"take a break\" plan  4. Continue to maintain emotional boundaries with , use assertive communication skills  5. Have  review divorce documents  sent you via email  6. Use your script when someone asks, Catherine Metzop was your summer? \", broken record, \"thank you for asking, but I don't want talk about it right now\" and repeat, and repeat again   7.  Return to clinic for Dr. Phi Crabtree on 8/13

## 2018-08-13 ENCOUNTER — OFFICE VISIT (OUTPATIENT)
Dept: PSYCHOLOGY | Age: 53
End: 2018-08-13

## 2018-08-13 DIAGNOSIS — F41.1 GAD (GENERALIZED ANXIETY DISORDER): Primary | ICD-10-CM

## 2018-08-13 PROCEDURE — 90832 PSYTX W PT 30 MINUTES: CPT | Performed by: PSYCHOLOGIST

## 2018-08-13 ASSESSMENT — ANXIETY QUESTIONNAIRES
GAD7 TOTAL SCORE: 2
2. NOT BEING ABLE TO STOP OR CONTROL WORRYING: 0-NOT AT ALL SURE
7. FEELING AFRAID AS IF SOMETHING AWFUL MIGHT HAPPEN: 1-SEVERAL DAYS
1. FEELING NERVOUS, ANXIOUS, OR ON EDGE: 1-SEVERAL DAYS
5. BEING SO RESTLESS THAT IT IS HARD TO SIT STILL: 0-NOT AT ALL SURE
3. WORRYING TOO MUCH ABOUT DIFFERENT THINGS: 0-NOT AT ALL SURE
6. BECOMING EASILY ANNOYED OR IRRITABLE: 0-NOT AT ALL SURE
4. TROUBLE RELAXING: 0-NOT AT ALL SURE

## 2018-08-13 ASSESSMENT — PATIENT HEALTH QUESTIONNAIRE - PHQ9
SUM OF ALL RESPONSES TO PHQ QUESTIONS 1-9: 0
2. FEELING DOWN, DEPRESSED OR HOPELESS: 0
SUM OF ALL RESPONSES TO PHQ QUESTIONS 1-9: 0
SUM OF ALL RESPONSES TO PHQ9 QUESTIONS 1 & 2: 0
1. LITTLE INTEREST OR PLEASURE IN DOING THINGS: 0

## 2018-08-13 NOTE — PROGRESS NOTES
TWO PUFFS BY MOUTH EVERY 4 HOURS AS NEEDED FOR WHEEZING 1 Inhaler 4    Cholecalciferol (VITAMIN D3) 2000 UNITS CAPS Take 2,000 Units by mouth three times a week      Nutritional Supplements (JUICE PLUS FIBRE PO) Take 1.5 g by mouth daily Indications: Takes Juice Plus      Maitake Mushroom POWD 500 mg by Does not apply route daily      aspirin-acetaminophen-caffeine (EXCEDRIN MIGRAINE) 250-250-65 MG per tablet Take 1 tablet by mouth every 6 hours as needed for Headaches      cetirizine (ZYRTEC) 10 MG tablet Take 10 mg by mouth daily      letrozole (FEMARA) 2.5 MG tablet Take 2.5 mg by mouth daily. 4 times a week      ALPRAZolam (XANAX) 0.5 MG tablet Take 0.5 mg by mouth as needed. No current facility-administered medications for this visit. Social History:   Social History     Social History    Marital status:      Spouse name: N/A    Number of children: 0    Years of education: N/A     Occupational History    Not on file. Social History Main Topics    Smoking status: Never Smoker    Smokeless tobacco: Never Used    Alcohol use Yes      Comment: wine on weekends only    Drug use: No    Sexual activity: Not on file     Other Topics Concern    Not on file     Social History Narrative    No narrative on file       TOBACCO:   reports that she has never smoked. She has never used smokeless tobacco.  ETOH:   reports that she drinks alcohol.     Family History:   Family History   Problem Relation Age of Onset    Other Mother         MVP, Panic Disorder    Coronary Art Dis Father     High Blood Pressure Father     Heart Disease Father 36        MI    Diabetes Father 68        Type 2    Cancer Brother         Melanoma    High Blood Pressure Brother     Cancer Sister         Melanoma in situ    High Blood Pressure Sister     Brain Cancer Sister         Glioblastoma    Colon Cancer Paternal Grandmother          A:    See S: above    PHQ Scores 8/13/2018 7/24/2018 7/16/2018 7/9/2018 7/2/2018 6/22/2018 6/5/2018   PHQ2 Score 0 1 2 3 3 5 2   PHQ9 Score 0 1 2 14 10 17 2     Interpretation of Total Score Depression Severity: 1-4 = Minimal depression, 5-9 = Mild depression, 10-14 = Moderate depression, 15-19 = Moderately severe depression, 20-27 = Severe depression    BLANCA 7 SCORE 8/13/2018 7/24/2018 7/16/2018 7/9/2018 7/2/2018 6/22/2018 6/5/2018   BLANCA-7 Total Score 2 12 5 16 13 16 6     Interpretation of BLANCA-7 score: 5-9 = mild anxiety, 10-14 = moderate anxiety, 15+ = severe anxiety. Recommend referral to behavioral health for scores 10 or greater.     Diagnosis:    BLANCA      Diagnosis Date    Allergic rhinitis     Asthma     Breast cancer ([facility] [address] 11/2012    [facility] of blood clots 01/2013    Lungs    Miscarriage 2004    Premenstrual dysphoric syndrome     Pulmonary embolus ([facility] [address] 1/2013     Problems with primary support group    Plan:  Pt interventions:    Practiced assertive communication, Discussed self-care (sleep, nutrition, rewarding activities, social support, exercise), Discussed and problem-solved barriers in adhering to behavioral change plan and Supportive techniques    Pt Behavioral Change Plan:    1. Continue to take medication as prescribed  2. Talk to principal about possible need to take occasional day off from work as needed  3. Continue to slow down, take breaks as needed, talk with teaching partner and aid to set up \"take a break\" plan  4. Continue to maintain emotional boundaries with , use assertive communication skills  5. Have  review divorce documents  sent you via email  6. Use your script when someone asks, Janie Abelardo was your summer? \", broken record, \"thank you for asking, but I don't want talk about it right now\" and repeat, and repeat again   7.  Return to clinic for Dr. Lauro Álvarez on 8/28

## 2018-08-28 ENCOUNTER — OFFICE VISIT (OUTPATIENT)
Dept: PSYCHOLOGY | Age: 53
End: 2018-08-28

## 2018-08-28 DIAGNOSIS — F41.1 GAD (GENERALIZED ANXIETY DISORDER): Primary | ICD-10-CM

## 2018-08-28 PROCEDURE — 90832 PSYTX W PT 30 MINUTES: CPT | Performed by: PSYCHOLOGIST

## 2018-08-28 ASSESSMENT — ANXIETY QUESTIONNAIRES
2. NOT BEING ABLE TO STOP OR CONTROL WORRYING: 2-OVER HALF THE DAYS
1. FEELING NERVOUS, ANXIOUS, OR ON EDGE: 2-OVER HALF THE DAYS
6. BECOMING EASILY ANNOYED OR IRRITABLE: 1-SEVERAL DAYS
5. BEING SO RESTLESS THAT IT IS HARD TO SIT STILL: 1-SEVERAL DAYS
3. WORRYING TOO MUCH ABOUT DIFFERENT THINGS: 2-OVER HALF THE DAYS
GAD7 TOTAL SCORE: 11
4. TROUBLE RELAXING: 2-OVER HALF THE DAYS
7. FEELING AFRAID AS IF SOMETHING AWFUL MIGHT HAPPEN: 1-SEVERAL DAYS

## 2018-08-28 ASSESSMENT — PATIENT HEALTH QUESTIONNAIRE - PHQ9
2. FEELING DOWN, DEPRESSED OR HOPELESS: 1
SUM OF ALL RESPONSES TO PHQ QUESTIONS 1-9: 2
1. LITTLE INTEREST OR PLEASURE IN DOING THINGS: 1
SUM OF ALL RESPONSES TO PHQ QUESTIONS 1-9: 2
SUM OF ALL RESPONSES TO PHQ9 QUESTIONS 1 & 2: 2

## 2018-08-28 NOTE — PROGRESS NOTES
Supplements (JUICE PLUS FIBRE PO) Take 1.5 g by mouth daily Indications: Takes Juice Plus      Maitake Mushroom POWD 500 mg by Does not apply route daily      aspirin-acetaminophen-caffeine (EXCEDRIN MIGRAINE) 250-250-65 MG per tablet Take 1 tablet by mouth every 6 hours as needed for Headaches      cetirizine (ZYRTEC) 10 MG tablet Take 10 mg by mouth daily      letrozole (FEMARA) 2.5 MG tablet Take 2.5 mg by mouth daily. 4 times a week      ALPRAZolam (XANAX) 0.5 MG tablet Take 0.5 mg by mouth as needed. No current facility-administered medications for this visit. Social History:   Social History     Social History    Marital status:      Spouse name: N/A    Number of children: 0    Years of education: N/A     Occupational History    Not on file. Social History Main Topics    Smoking status: Never Smoker    Smokeless tobacco: Never Used    Alcohol use Yes      Comment: wine on weekends only    Drug use: No    Sexual activity: Not on file     Other Topics Concern    Not on file     Social History Narrative    No narrative on file       TOBACCO:   reports that she has never smoked. She has never used smokeless tobacco.  ETOH:   reports that she drinks alcohol.     Family History:   Family History   Problem Relation Age of Onset    Other Mother         MVP, Panic Disorder    Coronary Art Dis Father     High Blood Pressure Father     Heart Disease Father 36        MI    Diabetes Father 68        Type 2    Cancer Brother         Melanoma    High Blood Pressure Brother     Cancer Sister         Melanoma in situ    High Blood Pressure Sister     Brain Cancer Sister         Glioblastoma    Colon Cancer Paternal Grandmother          A:    See S: above    PHQ Scores 8/28/2018 8/13/2018 7/24/2018 7/16/2018 7/9/2018 7/2/2018 6/22/2018   PHQ2 Score 2 0 1 2 3 3 5   PHQ9 Score 2 0 1 2 14 10 17     Interpretation of Total Score Depression Severity: 1-4 = Minimal depression, 5-9 =

## 2018-08-28 NOTE — PATIENT INSTRUCTIONS
1. Continue to take medication as prescribed  2. Talk to principal about possible need to take occasional day off from work as needed  3. Continue to slow down, take breaks as needed, talk with teaching partner and aid to set up \"take a break\" plan  4. Continue to maintain emotional boundaries with , use assertive communication skills  5. 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  Return to clinic for Dr. Ra Cummings on 9/21

## 2018-09-21 ENCOUNTER — OFFICE VISIT (OUTPATIENT)
Dept: PSYCHOLOGY | Age: 53
End: 2018-09-21

## 2018-09-21 DIAGNOSIS — F41.1 GAD (GENERALIZED ANXIETY DISORDER): Primary | ICD-10-CM

## 2018-09-21 PROCEDURE — 90832 PSYTX W PT 30 MINUTES: CPT | Performed by: PSYCHOLOGIST

## 2018-09-21 ASSESSMENT — PATIENT HEALTH QUESTIONNAIRE - PHQ9
SUM OF ALL RESPONSES TO PHQ QUESTIONS 1-9: 2
2. FEELING DOWN, DEPRESSED OR HOPELESS: 1
1. LITTLE INTEREST OR PLEASURE IN DOING THINGS: 1
SUM OF ALL RESPONSES TO PHQ QUESTIONS 1-9: 2
SUM OF ALL RESPONSES TO PHQ9 QUESTIONS 1 & 2: 2

## 2018-09-21 ASSESSMENT — ANXIETY QUESTIONNAIRES
4. TROUBLE RELAXING: 1-SEVERAL DAYS
GAD7 TOTAL SCORE: 7
7. FEELING AFRAID AS IF SOMETHING AWFUL MIGHT HAPPEN: 0-NOT AT ALL SURE
1. FEELING NERVOUS, ANXIOUS, OR ON EDGE: 1-SEVERAL DAYS
5. BEING SO RESTLESS THAT IT IS HARD TO SIT STILL: 0-NOT AT ALL SURE
6. BECOMING EASILY ANNOYED OR IRRITABLE: 1-SEVERAL DAYS
2. NOT BEING ABLE TO STOP OR CONTROL WORRYING: 2-OVER HALF THE DAYS
3. WORRYING TOO MUCH ABOUT DIFFERENT THINGS: 2-OVER HALF THE DAYS

## 2018-09-21 NOTE — PROGRESS NOTES
Behavioral Health Consultation Follow-up  Jannet Franco PsyD  Psychologist  9/21/2018  4:10 PM      Time spent with Patient: 30 minutes  This is patient's second  [facility] appointment. Reason for Consult:  BLANCA  Referring Provider: Mia Mckeon MD  [address] [facility], [address] [facility]    Feedback given to PCP. S:    Last appt: 8/28. Took day off yesterday from work, spent time with mother and sister, this is a huge step for pt in terms of self-care! Also scheduled down time to get her hair done for stress reduction. Met with  for coffee, about 2 hours, casual conversation, still very detached, upsetting so took day off the next few days. Feels like she is moving along in her letting go process, getting more closure as she has occasional contact with her .      O:  MSE:    Appearance    alert, cooperative  Appetite normal  Sleep disturbance better  Fatigue better  Loss of pleasure better  Impulsive behavior No  Speech    normal rate, normal volume and well articulated  Mood    Stress levels down  Affect    Congruent with full range  Thought Content    intact  Thought Process    linear, goal directed and coherent  Associations    logical connections  Insight    Good  Judgment    Intact  Orientation    oriented to person, place, time, and general circumstances  Memory    recent and remote memory intact  Attention/Concentration    intact  Morbid ideation No  Suicide Assessment    no suicidal ideation      History:    Medications:   Current Outpatient Prescriptions   Medication Sig Dispense Refill    FLUoxetine (PROZAC) 40 MG capsule TAKE ONE CAPSULE BY MOUTH DAILY 30 capsule 11    amLODIPine (NORVASC) 5 MG tablet Take 1 tablet by mouth daily 90 tablet 1    VENTOLIN  (90 Base) MCG/ACT inhaler INHALE TWO PUFFS BY MOUTH EVERY 4 HOURS AS NEEDED FOR WHEEZING 1 Inhaler 4    Cholecalciferol (VITAMIN D3) 2000 UNITS CAPS Take 2,000 Units by mouth three times a week      Nutritional Mild depression, 10-14 = Moderate depression, 15-19 = Moderately severe depression, 20-27 = Severe depression    BLANCA 7 SCORE 9/21/2018 8/28/2018 8/13/2018 7/24/2018 7/16/2018 7/9/2018 7/2/2018   BLANCA-7 Total Score 7 11 2 12 5 16 13     Interpretation of BLANCA-7 score: 5-9 = mild anxiety, 10-14 = moderate anxiety, 15+ = severe anxiety. Recommend referral to behavioral health for scores 10 or greater. Diagnosis:    BLANCA; grief reaction      Diagnosis Date    Allergic rhinitis     Asthma     Breast cancer ([facility] [address] 11/2012    [facility] of blood clots 01/2013    Lungs    Miscarriage 2004    Premenstrual dysphoric syndrome     Pulmonary embolus ([facility] [address] 1/2013     Problems with primary support group    Plan:  Pt interventions:    Practiced assertive communication, Trained in strategies for increasing balanced thinking, Discussed self-care (sleep, nutrition, rewarding activities, social support, exercise), Discussed and problem-solved barriers in adhering to behavioral change plan and Supportive techniques    Pt Behavioral Change Plan:    1. Continue to take medication as prescribed  2. Continue to schedule a day off work as needed for stress reduction  3. Continue to slow down, take breaks as needed, talk with teaching partner and aid to set up \"take a break\" plan  4. Continue to maintain emotional boundaries with , use assertive communication skills  5.  Return to clinic for Dr. Antelmo Ferguson in 3 weeks

## 2018-10-09 ENCOUNTER — OFFICE VISIT (OUTPATIENT)
Dept: PSYCHOLOGY | Age: 53
End: 2018-10-09
Payer: COMMERCIAL

## 2018-10-09 DIAGNOSIS — F41.1 GENERALIZED ANXIETY DISORDER: Primary | ICD-10-CM

## 2018-10-09 PROCEDURE — 90832 PSYTX W PT 30 MINUTES: CPT | Performed by: PSYCHOLOGIST

## 2018-10-09 ASSESSMENT — ANXIETY QUESTIONNAIRES
3. WORRYING TOO MUCH ABOUT DIFFERENT THINGS: 2-OVER HALF THE DAYS
6. BECOMING EASILY ANNOYED OR IRRITABLE: 1-SEVERAL DAYS
7. FEELING AFRAID AS IF SOMETHING AWFUL MIGHT HAPPEN: 0-NOT AT ALL SURE
1. FEELING NERVOUS, ANXIOUS, OR ON EDGE: 3-NEARLY EVERY DAY
GAD7 TOTAL SCORE: 10
2. NOT BEING ABLE TO STOP OR CONTROL WORRYING: 2-OVER HALF THE DAYS
5. BEING SO RESTLESS THAT IT IS HARD TO SIT STILL: 1-SEVERAL DAYS
4. TROUBLE RELAXING: 1-SEVERAL DAYS

## 2018-10-09 ASSESSMENT — PATIENT HEALTH QUESTIONNAIRE - PHQ9
1. LITTLE INTEREST OR PLEASURE IN DOING THINGS: 2
2. FEELING DOWN, DEPRESSED OR HOPELESS: 2
SUM OF ALL RESPONSES TO PHQ QUESTIONS 1-9: 12
SUM OF ALL RESPONSES TO PHQ QUESTIONS 1-9: 12
3. TROUBLE FALLING OR STAYING ASLEEP: 1
SUM OF ALL RESPONSES TO PHQ9 QUESTIONS 1 & 2: 4
6. FEELING BAD ABOUT YOURSELF - OR THAT YOU ARE A FAILURE OR HAVE LET YOURSELF OR YOUR FAMILY DOWN: 1
8. MOVING OR SPEAKING SO SLOWLY THAT OTHER PEOPLE COULD HAVE NOTICED. OR THE OPPOSITE, BEING SO FIGETY OR RESTLESS THAT YOU HAVE BEEN MOVING AROUND A LOT MORE THAN USUAL: 1
9. THOUGHTS THAT YOU WOULD BE BETTER OFF DEAD, OR OF HURTING YOURSELF: 0
10. IF YOU CHECKED OFF ANY PROBLEMS, HOW DIFFICULT HAVE THESE PROBLEMS MADE IT FOR YOU TO DO YOUR WORK, TAKE CARE OF THINGS AT HOME, OR GET ALONG WITH OTHER PEOPLE: 2
5. POOR APPETITE OR OVEREATING: 1
4. FEELING TIRED OR HAVING LITTLE ENERGY: 3
7. TROUBLE CONCENTRATING ON THINGS, SUCH AS READING THE NEWSPAPER OR WATCHING TELEVISION: 1

## 2018-10-30 ENCOUNTER — OFFICE VISIT (OUTPATIENT)
Dept: PSYCHOLOGY | Age: 53
End: 2018-10-30
Payer: COMMERCIAL

## 2018-10-30 DIAGNOSIS — F41.1 GENERALIZED ANXIETY DISORDER: Primary | ICD-10-CM

## 2018-10-30 PROCEDURE — 90832 PSYTX W PT 30 MINUTES: CPT | Performed by: PSYCHOLOGIST

## 2018-10-30 ASSESSMENT — PATIENT HEALTH QUESTIONNAIRE - PHQ9
SUM OF ALL RESPONSES TO PHQ QUESTIONS 1-9: 1
2. FEELING DOWN, DEPRESSED OR HOPELESS: 1
SUM OF ALL RESPONSES TO PHQ9 QUESTIONS 1 & 2: 1
SUM OF ALL RESPONSES TO PHQ QUESTIONS 1-9: 1
1. LITTLE INTEREST OR PLEASURE IN DOING THINGS: 0

## 2018-10-30 ASSESSMENT — ANXIETY QUESTIONNAIRES
1. FEELING NERVOUS, ANXIOUS, OR ON EDGE: 1-SEVERAL DAYS
3. WORRYING TOO MUCH ABOUT DIFFERENT THINGS: 2-OVER HALF THE DAYS
6. BECOMING EASILY ANNOYED OR IRRITABLE: 1-SEVERAL DAYS
2. NOT BEING ABLE TO STOP OR CONTROL WORRYING: 1-SEVERAL DAYS
GAD7 TOTAL SCORE: 6
5. BEING SO RESTLESS THAT IT IS HARD TO SIT STILL: 0-NOT AT ALL SURE
7. FEELING AFRAID AS IF SOMETHING AWFUL MIGHT HAPPEN: 0-NOT AT ALL SURE
4. TROUBLE RELAXING: 1-SEVERAL DAYS

## 2018-10-30 NOTE — PROGRESS NOTES
7/16/2018   PHQ2 Score 1 4 2 2 0 1 2   PHQ9 Score 1 12 2 2 0 1 2     Interpretation of Total Score Depression Severity: 1-4 = Minimal depression, 5-9 = Mild depression, 10-14 = Moderate depression, 15-19 = Moderately severe depression, 20-27 = Severe depression    BLANCA 7 SCORE 10/30/2018 10/9/2018 9/21/2018 8/28/2018 8/13/2018 7/24/2018 7/16/2018   BLANCA-7 Total Score 6 10 7 11 2 12 5     Interpretation of BLANCA-7 score: 5-9 = mild anxiety, 10-14 = moderate anxiety, 15+ = severe anxiety. Recommend referral to behavioral health for scores 10 or greater. Diagnosis:    BLANCA      Diagnosis Date    Allergic rhinitis     Asthma     Breast cancer ([facility] 11/2012    [place]    Hx of blood clots 01/2013    Lungs    Miscarriage 2004    Premenstrual dysphoric syndrome     Pulmonary embolus ([facility] [address] 1/2013     Problems with primary support group    Plan:  Pt interventions:    Practiced assertive communication, Trained in strategies for increasing balanced thinking, Discussed and problem-solved barriers in adhering to behavioral change plan, Discussed potential barriers to change and Supportive techniques    Pt Behavioral Change Plan:    1. Continue to slow down any amount, take mental health day from work as needed  2. Continue to take medication as prescribed   3. Continue to slow down, take breaks as needed at work, talk with teaching partner and aid to set up \"take a break\" plan  4. Continue to maintain emotional boundaries with , use assertive communication skills  5.  Return to clinic for Dr. Octavio Kearns in 3 weeks

## 2018-11-20 ENCOUNTER — OFFICE VISIT (OUTPATIENT)
Dept: PSYCHOLOGY | Age: 53
End: 2018-11-20
Payer: COMMERCIAL

## 2018-11-20 DIAGNOSIS — F41.1 GENERALIZED ANXIETY DISORDER: Primary | ICD-10-CM

## 2018-11-20 PROCEDURE — 90832 PSYTX W PT 30 MINUTES: CPT | Performed by: PSYCHOLOGIST

## 2018-11-20 ASSESSMENT — PATIENT HEALTH QUESTIONNAIRE - PHQ9
SUM OF ALL RESPONSES TO PHQ QUESTIONS 1-9: 2
2. FEELING DOWN, DEPRESSED OR HOPELESS: 1
1. LITTLE INTEREST OR PLEASURE IN DOING THINGS: 1
SUM OF ALL RESPONSES TO PHQ9 QUESTIONS 1 & 2: 2
SUM OF ALL RESPONSES TO PHQ QUESTIONS 1-9: 2

## 2018-11-20 ASSESSMENT — ANXIETY QUESTIONNAIRES
2. NOT BEING ABLE TO STOP OR CONTROL WORRYING: 2-OVER HALF THE DAYS
GAD7 TOTAL SCORE: 7
3. WORRYING TOO MUCH ABOUT DIFFERENT THINGS: 2-OVER HALF THE DAYS
4. TROUBLE RELAXING: 1-SEVERAL DAYS
7. FEELING AFRAID AS IF SOMETHING AWFUL MIGHT HAPPEN: 0-NOT AT ALL SURE
6. BECOMING EASILY ANNOYED OR IRRITABLE: 1-SEVERAL DAYS
1. FEELING NERVOUS, ANXIOUS, OR ON EDGE: 1-SEVERAL DAYS
5. BEING SO RESTLESS THAT IT IS HARD TO SIT STILL: 0-NOT AT ALL SURE

## 2018-11-20 NOTE — PROGRESS NOTES
Behavioral Health Consultation Follow-up  Kirit King PsyD  Psychologist  11/20/2018  10:06 AM      Time spent with Patient: 35 minutes  This is patient's fifth  [facility] appointment. Reason for Consult:  BLANCA  Referring Provider: Milagros Flores MD  [address] [facility], 122 [facility]    Feedback given to PCP. S:    Last appt: 10/30. More processing of normal grief reaction with marriage ending. Still getting the details of the divorce.  continues to be in and out of contact whenever its convenient for him. Pt trying to stay focused on the blessings in her life right now: spending more time with mother during holiday, helping mother with turkey for the first time, and spending more time with her sister. Will be with family on Thanksgiving day. Excited about looking for her own home, this Friday has some showings she will go to: [facility], and one on this side of [facility].      Medical needs: Going to follow up with B-12 blood work    O:  MSE:    Appearance    Tearful at times, alert, cooperative  Appetite abnormal: okay  Sleep disturbance No  Fatigue Yes  Loss of pleasure Yes  Impulsive behavior No  Speech    normal rate, normal volume and well articulated  Mood    Sad about marriage, appropriate, managing  Affect    Congruent with full range  Thought Content    intact  Thought Process    linear, goal directed and coherent  Associations    logical connections  Insight    Good  Judgment    Intact  Orientation    oriented to person, place, time, and general circumstances  Memory    recent and remote memory intact  Attention/Concentration    intact  Morbid ideation No  Suicide Assessment    no suicidal ideation      History:    Medications:   Current Outpatient Prescriptions   Medication Sig Dispense Refill    FLUoxetine (PROZAC) 40 MG capsule TAKE ONE CAPSULE BY MOUTH DAILY 30 capsule 11    amLODIPine (NORVASC) 5 MG tablet Take 1 tablet by mouth daily 90 tablet 1    VENTOLIN HFA 108 (90 Base) MCG/ACT inhaler INHALE TWO PUFFS BY MOUTH EVERY 4 HOURS AS NEEDED FOR WHEEZING 1 Inhaler 4    Cholecalciferol (VITAMIN D3) 2000 UNITS CAPS Take 2,000 Units by mouth three times a week      Nutritional Supplements (JUICE PLUS FIBRE PO) Take 1.5 g by mouth daily Indications: Takes Juice Plus      Maitake Mushroom POWD 500 mg by Does not apply route daily      aspirin-acetaminophen-caffeine (EXCEDRIN MIGRAINE) 250-250-65 MG per tablet Take 1 tablet by mouth every 6 hours as needed for Headaches      cetirizine (ZYRTEC) 10 MG tablet Take 10 mg by mouth daily      letrozole (FEMARA) 2.5 MG tablet Take 2.5 mg by mouth daily. 4 times a week      ALPRAZolam (XANAX) 0.5 MG tablet Take 0.5 mg by mouth as needed. No current facility-administered medications for this visit. Social History:   Social History     Social History    Marital status:      Spouse name: N/A    Number of children: 0    Years of education: N/A     Occupational History    Not on file. Social History Main Topics    Smoking status: Never Smoker    Smokeless tobacco: Never Used    Alcohol use Yes      Comment: wine on weekends only    Drug use: No    Sexual activity: Not on file     Other Topics Concern    Not on file     Social History Narrative    No narrative on file       TOBACCO:   reports that she has never smoked. She has never used smokeless tobacco.  ETOH:   reports that she drinks alcohol.     Family History:   Family History   Problem Relation Age of Onset    Other Mother         MVP, Panic Disorder    Coronary Art Dis Father     High Blood Pressure Father     Heart Disease Father 36        MI    Diabetes Father 68        Type 2    Cancer Brother         Melanoma    High Blood Pressure Brother     Cancer Sister         Melanoma in situ    High Blood Pressure Sister     Brain Cancer Sister         Glioblastoma    Colon Cancer Paternal Grandmother          A:    See S: above    PHQ

## 2018-11-20 NOTE — PATIENT INSTRUCTIONS
1. Continue to stay connected socially  2. Have fun looking at houses this Friday  3. Spend time with family on Thanksgiving day  4. Continue to maintain emotional boundaries with  as needed  5.  Return to clinic for Dr Landy Muller in 3 weeks

## 2018-12-21 ENCOUNTER — OFFICE VISIT (OUTPATIENT)
Dept: PSYCHOLOGY | Age: 53
End: 2018-12-21
Payer: COMMERCIAL

## 2018-12-21 DIAGNOSIS — F34.1 PERSISTENT DEPRESSIVE DISORDER: ICD-10-CM

## 2018-12-21 DIAGNOSIS — F41.1 GAD (GENERALIZED ANXIETY DISORDER): Primary | ICD-10-CM

## 2018-12-21 PROCEDURE — 90834 PSYTX W PT 45 MINUTES: CPT | Performed by: PSYCHOLOGIST

## 2018-12-21 ASSESSMENT — PATIENT HEALTH QUESTIONNAIRE - PHQ9
SUM OF ALL RESPONSES TO PHQ9 QUESTIONS 1 & 2: 4
3. TROUBLE FALLING OR STAYING ASLEEP: 2
5. POOR APPETITE OR OVEREATING: 2
2. FEELING DOWN, DEPRESSED OR HOPELESS: 2
9. THOUGHTS THAT YOU WOULD BE BETTER OFF DEAD, OR OF HURTING YOURSELF: 0
SUM OF ALL RESPONSES TO PHQ QUESTIONS 1-9: 15
SUM OF ALL RESPONSES TO PHQ QUESTIONS 1-9: 15
6. FEELING BAD ABOUT YOURSELF - OR THAT YOU ARE A FAILURE OR HAVE LET YOURSELF OR YOUR FAMILY DOWN: 2
8. MOVING OR SPEAKING SO SLOWLY THAT OTHER PEOPLE COULD HAVE NOTICED. OR THE OPPOSITE, BEING SO FIGETY OR RESTLESS THAT YOU HAVE BEEN MOVING AROUND A LOT MORE THAN USUAL: 1
4. FEELING TIRED OR HAVING LITTLE ENERGY: 2
1. LITTLE INTEREST OR PLEASURE IN DOING THINGS: 2
7. TROUBLE CONCENTRATING ON THINGS, SUCH AS READING THE NEWSPAPER OR WATCHING TELEVISION: 2
10. IF YOU CHECKED OFF ANY PROBLEMS, HOW DIFFICULT HAVE THESE PROBLEMS MADE IT FOR YOU TO DO YOUR WORK, TAKE CARE OF THINGS AT HOME, OR GET ALONG WITH OTHER PEOPLE: 2

## 2018-12-21 ASSESSMENT — ANXIETY QUESTIONNAIRES
7. FEELING AFRAID AS IF SOMETHING AWFUL MIGHT HAPPEN: 2-OVER HALF THE DAYS
GAD7 TOTAL SCORE: 18
3. WORRYING TOO MUCH ABOUT DIFFERENT THINGS: 3-NEARLY EVERY DAY
2. NOT BEING ABLE TO STOP OR CONTROL WORRYING: 3-NEARLY EVERY DAY
4. TROUBLE RELAXING: 3-NEARLY EVERY DAY
6. BECOMING EASILY ANNOYED OR IRRITABLE: 2-OVER HALF THE DAYS
1. FEELING NERVOUS, ANXIOUS, OR ON EDGE: 3-NEARLY EVERY DAY
5. BEING SO RESTLESS THAT IT IS HARD TO SIT STILL: 2-OVER HALF THE DAYS

## 2018-12-21 NOTE — PROGRESS NOTES
Behavioral Health Consultation Follow-up  Joan Prince PsyD  Psychologist  2018  2:11 PM      Time spent with Patient: 40 minutes  This is patient's sixth  [facility] appointment. Reason for Consult:  BLANCA  Referring Provider: David Ledesma MD  168 [facility], 122 [facility]    Feedback given to PCP. S:    Last appt: . Going to go to  on . Friend  of pancreatic cancer,  was this past Tuesday. Ready for next steps with divorce. [facility]. We took some time to celebrate her own cancer-versary, her friend's life, and the upcoming new year. This is going to be difficult Pedro as this will be the first Pedro without . It is also particularly difficult because they got engaged on [place] in .      O:  MSE:    Appearance    alert, cooperative, crying  Appetite abnormal: poor  Sleep disturbance Yes  Fatigue Yes  Loss of pleasure Yes  Impulsive behavior No  Speech    normal rate, normal volume and well articulated  Mood    Sad about many losses  Affect    Congruent with full range  Thought Content    intact  Thought Process    linear, goal directed and coherent  Associations    logical connections  Insight    Good  Judgment    Intact  Orientation    oriented to person, place, time, and general circumstances  Memory    recent and remote memory intact  Attention/Concentration    intact  Morbid ideation No  Suicide Assessment    no suicidal ideation    History:    Medications:   Current Outpatient Prescriptions   Medication Sig Dispense Refill    amLODIPine (NORVASC) 5 MG tablet Take 1 tablet by mouth daily 90 tablet 1    FLUoxetine (PROZAC) 40 MG capsule TAKE ONE CAPSULE BY MOUTH DAILY 30 capsule 11    VENTOLIN  (90 Base) MCG/ACT inhaler INHALE TWO PUFFS BY MOUTH EVERY 4 HOURS AS NEEDED FOR WHEEZING 1 Inhaler 4    Cholecalciferol (VITAMIN D3) 2000 UNITS CAPS Take 2,000 Units by mouth three Severity: 1-4 = Minimal depression, 5-9 = Mild depression, 10-14 = Moderate depression, 15-19 = Moderately severe depression, 20-27 = Severe depression    BLANCA 7 SCORE 12/21/2018 11/20/2018 10/30/2018 10/9/2018 9/21/2018 8/28/2018 8/13/2018   BLANCA-7 Total Score 18 7 6 10 7 11 2     Interpretation of BLANCA-7 score: 5-9 = mild anxiety, 10-14 = moderate anxiety, 15+ = severe anxiety. Recommend referral to behavioral health for scores 10 or greater. Diagnosis:    BLANCA, Persistent depressive disorder       Diagnosis Date    Allergic rhinitis     Asthma     Breast cancer ([facility] [address] 11/2012    [facility] of blood clots 01/2013    Lungs    Miscarriage 2004    Premenstrual dysphoric syndrome     Pulmonary embolus ([facility] 1/2013     Problems with primary support group and Housing problems    Plan:  Pt interventions:    Practiced assertive communication, Trained in strategies for increasing balanced thinking, Discussed self-care (sleep, nutrition, rewarding activities, social support, exercise), Discussed and problem-solved barriers in adhering to behavioral change plan and Supportive techniques    Pt Behavioral Change Plan:    1. Continue to stay connected socially  2. Go to  on 12/27 to start divorce paperwork  3. Spend time with family on Christmas eve and [place] day  4. Continue to maintain emotional boundaries with  as needed  5.  Return to clinic for Dr Mohinder Henriquez in 2 weeks

## 2018-12-21 NOTE — PATIENT INSTRUCTIONS
1. Continue to stay connected socially  2. Go to  on 12/27 to start divorce paperwork  3. Spend time with family on Christmas eve and [place] day  4. Continue to maintain emotional boundaries with  as needed  5.  Return to clinic for Dr New Correa in 2 weeks

## 2019-01-22 ENCOUNTER — OFFICE VISIT (OUTPATIENT)
Dept: PSYCHOLOGY | Age: 54
End: 2019-01-22
Payer: COMMERCIAL

## 2019-01-22 DIAGNOSIS — F34.1 PERSISTENT DEPRESSIVE DISORDER: ICD-10-CM

## 2019-01-22 DIAGNOSIS — F41.1 GAD (GENERALIZED ANXIETY DISORDER): Primary | ICD-10-CM

## 2019-01-22 PROCEDURE — 90832 PSYTX W PT 30 MINUTES: CPT | Performed by: PSYCHOLOGIST

## 2019-01-22 ASSESSMENT — ANXIETY QUESTIONNAIRES
5. BEING SO RESTLESS THAT IT IS HARD TO SIT STILL: 0-NOT AT ALL SURE
4. TROUBLE RELAXING: 0-NOT AT ALL SURE
GAD7 TOTAL SCORE: 2
7. FEELING AFRAID AS IF SOMETHING AWFUL MIGHT HAPPEN: 0-NOT AT ALL SURE
1. FEELING NERVOUS, ANXIOUS, OR ON EDGE: 1-SEVERAL DAYS
2. NOT BEING ABLE TO STOP OR CONTROL WORRYING: 0-NOT AT ALL SURE
3. WORRYING TOO MUCH ABOUT DIFFERENT THINGS: 1-SEVERAL DAYS
6. BECOMING EASILY ANNOYED OR IRRITABLE: 0-NOT AT ALL SURE

## 2019-01-22 ASSESSMENT — PATIENT HEALTH QUESTIONNAIRE - PHQ9
2. FEELING DOWN, DEPRESSED OR HOPELESS: 0
1. LITTLE INTEREST OR PLEASURE IN DOING THINGS: 0
SUM OF ALL RESPONSES TO PHQ9 QUESTIONS 1 & 2: 0
SUM OF ALL RESPONSES TO PHQ QUESTIONS 1-9: 0
SUM OF ALL RESPONSES TO PHQ QUESTIONS 1-9: 0

## 2019-02-08 ENCOUNTER — OFFICE VISIT (OUTPATIENT)
Dept: PSYCHOLOGY | Age: 54
End: 2019-02-08
Payer: COMMERCIAL

## 2019-02-08 DIAGNOSIS — F34.1 PERSISTENT DEPRESSIVE DISORDER: ICD-10-CM

## 2019-02-08 DIAGNOSIS — F41.1 GAD (GENERALIZED ANXIETY DISORDER): Primary | ICD-10-CM

## 2019-02-08 PROCEDURE — 90832 PSYTX W PT 30 MINUTES: CPT | Performed by: PSYCHOLOGIST

## 2019-03-12 ENCOUNTER — OFFICE VISIT (OUTPATIENT)
Dept: PSYCHOLOGY | Age: 54
End: 2019-03-12
Payer: COMMERCIAL

## 2019-03-12 DIAGNOSIS — F34.1 PERSISTENT DEPRESSIVE DISORDER: ICD-10-CM

## 2019-03-12 DIAGNOSIS — F41.1 GAD (GENERALIZED ANXIETY DISORDER): Primary | ICD-10-CM

## 2019-03-12 PROCEDURE — 90834 PSYTX W PT 45 MINUTES: CPT | Performed by: PSYCHOLOGIST

## 2019-03-12 ASSESSMENT — ANXIETY QUESTIONNAIRES
2. NOT BEING ABLE TO STOP OR CONTROL WORRYING: 3-NEARLY EVERY DAY
7. FEELING AFRAID AS IF SOMETHING AWFUL MIGHT HAPPEN: 1-SEVERAL DAYS
3. WORRYING TOO MUCH ABOUT DIFFERENT THINGS: 3-NEARLY EVERY DAY
6. BECOMING EASILY ANNOYED OR IRRITABLE: 2-OVER HALF THE DAYS
4. TROUBLE RELAXING: 3-NEARLY EVERY DAY
5. BEING SO RESTLESS THAT IT IS HARD TO SIT STILL: 1-SEVERAL DAYS
GAD7 TOTAL SCORE: 16
1. FEELING NERVOUS, ANXIOUS, OR ON EDGE: 3-NEARLY EVERY DAY

## 2019-03-12 ASSESSMENT — PATIENT HEALTH QUESTIONNAIRE - PHQ9
SUM OF ALL RESPONSES TO PHQ9 QUESTIONS 1 & 2: 6
2. FEELING DOWN, DEPRESSED OR HOPELESS: 3
9. THOUGHTS THAT YOU WOULD BE BETTER OFF DEAD, OR OF HURTING YOURSELF: 0
4. FEELING TIRED OR HAVING LITTLE ENERGY: 3
10. IF YOU CHECKED OFF ANY PROBLEMS, HOW DIFFICULT HAVE THESE PROBLEMS MADE IT FOR YOU TO DO YOUR WORK, TAKE CARE OF THINGS AT HOME, OR GET ALONG WITH OTHER PEOPLE: 2
5. POOR APPETITE OR OVEREATING: 2
SUM OF ALL RESPONSES TO PHQ QUESTIONS 1-9: 19
7. TROUBLE CONCENTRATING ON THINGS, SUCH AS READING THE NEWSPAPER OR WATCHING TELEVISION: 2
1. LITTLE INTEREST OR PLEASURE IN DOING THINGS: 3
SUM OF ALL RESPONSES TO PHQ QUESTIONS 1-9: 19
6. FEELING BAD ABOUT YOURSELF - OR THAT YOU ARE A FAILURE OR HAVE LET YOURSELF OR YOUR FAMILY DOWN: 3
3. TROUBLE FALLING OR STAYING ASLEEP: 2
8. MOVING OR SPEAKING SO SLOWLY THAT OTHER PEOPLE COULD HAVE NOTICED. OR THE OPPOSITE, BEING SO FIGETY OR RESTLESS THAT YOU HAVE BEEN MOVING AROUND A LOT MORE THAN USUAL: 1

## 2019-03-29 ENCOUNTER — OFFICE VISIT (OUTPATIENT)
Dept: PSYCHOLOGY | Age: 54
End: 2019-03-29
Payer: COMMERCIAL

## 2019-03-29 DIAGNOSIS — F34.1 PERSISTENT DEPRESSIVE DISORDER: ICD-10-CM

## 2019-03-29 DIAGNOSIS — F41.1 GAD (GENERALIZED ANXIETY DISORDER): Primary | ICD-10-CM

## 2019-03-29 PROCEDURE — 90832 PSYTX W PT 30 MINUTES: CPT | Performed by: PSYCHOLOGIST

## 2019-03-29 ASSESSMENT — PATIENT HEALTH QUESTIONNAIRE - PHQ9
8. MOVING OR SPEAKING SO SLOWLY THAT OTHER PEOPLE COULD HAVE NOTICED. OR THE OPPOSITE, BEING SO FIGETY OR RESTLESS THAT YOU HAVE BEEN MOVING AROUND A LOT MORE THAN USUAL: 0
SUM OF ALL RESPONSES TO PHQ QUESTIONS 1-9: 11
2. FEELING DOWN, DEPRESSED OR HOPELESS: 2
9. THOUGHTS THAT YOU WOULD BE BETTER OFF DEAD, OR OF HURTING YOURSELF: 0
SUM OF ALL RESPONSES TO PHQ QUESTIONS 1-9: 11
6. FEELING BAD ABOUT YOURSELF - OR THAT YOU ARE A FAILURE OR HAVE LET YOURSELF OR YOUR FAMILY DOWN: 2
4. FEELING TIRED OR HAVING LITTLE ENERGY: 1
1. LITTLE INTEREST OR PLEASURE IN DOING THINGS: 2
SUM OF ALL RESPONSES TO PHQ9 QUESTIONS 1 & 2: 4
3. TROUBLE FALLING OR STAYING ASLEEP: 1
7. TROUBLE CONCENTRATING ON THINGS, SUCH AS READING THE NEWSPAPER OR WATCHING TELEVISION: 1
5. POOR APPETITE OR OVEREATING: 2
10. IF YOU CHECKED OFF ANY PROBLEMS, HOW DIFFICULT HAVE THESE PROBLEMS MADE IT FOR YOU TO DO YOUR WORK, TAKE CARE OF THINGS AT HOME, OR GET ALONG WITH OTHER PEOPLE: 2

## 2019-03-29 ASSESSMENT — ANXIETY QUESTIONNAIRES
2. NOT BEING ABLE TO STOP OR CONTROL WORRYING: 3-NEARLY EVERY DAY
3. WORRYING TOO MUCH ABOUT DIFFERENT THINGS: 3-NEARLY EVERY DAY
GAD7 TOTAL SCORE: 15
5. BEING SO RESTLESS THAT IT IS HARD TO SIT STILL: 1-SEVERAL DAYS
6. BECOMING EASILY ANNOYED OR IRRITABLE: 1-SEVERAL DAYS
4. TROUBLE RELAXING: 3-NEARLY EVERY DAY
7. FEELING AFRAID AS IF SOMETHING AWFUL MIGHT HAPPEN: 1-SEVERAL DAYS
1. FEELING NERVOUS, ANXIOUS, OR ON EDGE: 3-NEARLY EVERY DAY

## 2019-03-29 NOTE — PROGRESS NOTES
Behavioral Health Consultation Follow-up  Margarita Rendon PsyD  Psychologist  3/29/2019  4:13 PM      Time spent with Patient: 35 minutes  This is patient's tenth  [facility] appointment. Reason for Consult:  BLANCA, PDD  Referring Provider: Joseluis Orellana MD  168 [facility], 122 [facility]    Feedback given to PCP. S:    Last appt: 3/12. Been going to grief group for the last 3 weeks at [address]. Walking and staying connected socially. Going to art  at [employer]. Going to wedding tomorrow. Nishant's bday today which has been rough. More processing of her normal grief reaction with  now that they have ended relationship again. Pt feel more clear that it is for good, for her. She can't trust him again.      O:  MSE:    Appearance    alert, cooperative, crying  Appetite normal  Sleep disturbance off and on  Fatigue No  Loss of pleasure Yes  Impulsive behavior No  Speech    normal rate, normal volume and well articulated  Mood    Stress levels down a bit even in the midst of break up  Affect    Congruent with full range  Thought Content    intact  Thought Process    linear, goal directed and coherent  Associations    logical connections  Insight    Good  Judgment    Intact  Orientation    oriented to person, place, time, and general circumstances  Memory    recent and remote memory intact  Attention/Concentration    intact  Morbid ideation No  Suicide Assessment    no suicidal ideation    History:    Medications:   Current Outpatient Medications   Medication Sig Dispense Refill    amLODIPine (NORVASC) 5 MG tablet TAKE ONE TABLET BY MOUTH DAILY 30 tablet 5    FLUoxetine (PROZAC) 40 MG capsule TAKE ONE CAPSULE BY MOUTH DAILY 30 capsule 11    VENTOLIN  (90 Base) MCG/ACT inhaler INHALE TWO PUFFS BY MOUTH EVERY 4 HOURS AS NEEDED FOR WHEEZING 1 Inhaler 4    Cholecalciferol (VITAMIN D3) 2000 UNITS CAPS Take 2,000 Units by mouth three times a week      Nutritional Supplements (JUICE PLUS FIBRE PO) Take 1.5 g by mouth daily Indications: Takes Juice Plus      Maitake Mushroom POWD 500 mg by Does not apply route daily      aspirin-acetaminophen-caffeine (EXCEDRIN MIGRAINE) 250-250-65 MG per tablet Take 1 tablet by mouth every 6 hours as needed for Headaches      cetirizine (ZYRTEC) 10 MG tablet Take 10 mg by mouth daily      letrozole (FEMARA) 2.5 MG tablet Take 2.5 mg by mouth daily. 4 times a week      ALPRAZolam (XANAX) 0.5 MG tablet Take 0.5 mg by mouth as needed. No current facility-administered medications for this visit.         Social History:   Social History     Socioeconomic History    Marital status:      Spouse name: Not on file    Number of children: 0    Years of education: Not on file    Highest education level: Not on file   Occupational History    Not on file   Social Needs    Financial resource strain: Not on file    Food insecurity:     Worry: Not on file     Inability: Not on file    Transportation needs:     Medical: Not on file     Non-medical: Not on file   Tobacco Use    Smoking status: Never Smoker    Smokeless tobacco: Never Used   Substance and Sexual Activity    Alcohol use: Yes     Comment: wine on weekends only    Drug use: No    Sexual activity: Not on file   Lifestyle    Physical activity:     Days per week: Not on file     Minutes per session: Not on file    Stress: Not on file   Relationships    Social connections:     Talks on phone: Not on file     Gets together: Not on file     Attends Rastafarian service: Not on file     Active member of club or organization: Not on file     Attends meetings of clubs or organizations: Not on file     Relationship status: Not on file    Intimate partner violence:     Fear of current or ex partner: Not on file     Emotionally abused: Not on file     Physically abused: Not on file     Forced sexual activity: Not on file   Other Topics Concern    Not on file   Social History Narrative    Not on file       TOBACCO:   reports that she has never smoked. She has never used smokeless tobacco.  ETOH:   reports that she drinks alcohol. Family History:   Family History   Problem Relation Age of Onset    Other Mother         MVP, Panic Disorder    Coronary Art Dis Father     High Blood Pressure Father     Heart Disease Father 36        MI    Diabetes Father 68        Type 2    Cancer Brother         Melanoma    High Blood Pressure Brother     Cancer Sister         Melanoma in situ    High Blood Pressure Sister     Brain Cancer Sister         Glioblastoma    Colon Cancer Paternal Grandmother        A:    See S: above    PHQ Scores 3/29/2019 3/12/2019 1/22/2019 12/21/2018 11/20/2018 10/30/2018 10/9/2018   PHQ2 Score 4 6 0 4 2 1 4   PHQ9 Score 11 19 0 15 2 1 12     Interpretation of Total Score Depression Severity: 1-4 = Minimal depression, 5-9 = Mild depression, 10-14 = Moderate depression, 15-19 = Moderately severe depression, 20-27 = Severe depression    BLANCA 7 SCORE 3/29/2019 3/12/2019 1/22/2019 12/21/2018 11/20/2018 10/30/2018 10/9/2018   BLANCA-7 Total Score 15 16 2 18 7 6 10     Interpretation of BLANCA-7 score: 5-9 = mild anxiety, 10-14 = moderate anxiety, 15+ = severe anxiety. Recommend referral to behavioral health for scores 10 or greater. No si/hi risk. Diagnosis:    BLANCA, Persistent depressive disorder       Diagnosis Date    Allergic rhinitis     Asthma     Breast cancer ([facility] [address] 11/2012    [place]    Hx of blood clots 01/2013    Lungs    Miscarriage 2004    Premenstrual dysphoric syndrome     Pulmonary embolus ([facility] [address] 1/2013     Problems with primary support group      Plan:  Pt interventions:    Practiced assertive communication, Trained in strategies for increasing balanced thinking, Trained in relaxation strategies, Discussed self-care (sleep, nutrition, rewarding activities, social support, exercise) and Supportive techniques    Pt Behavioral Change Plan:    1. Continue to stay connected socially, spend time with girlfriends for fun and relaxation  2. Slow down, rest any amount  3. Continue fluoxetine as prescribed per Dr Gregory Mesa  4. Continue to maintain emotional boundaries with   5.  Return to clinic for Dr Eliza Mcclelland in 2-3 weeks

## 2019-04-12 ENCOUNTER — OFFICE VISIT (OUTPATIENT)
Dept: PSYCHOLOGY | Age: 54
End: 2019-04-12
Payer: COMMERCIAL

## 2019-04-12 DIAGNOSIS — F34.1 PERSISTENT DEPRESSIVE DISORDER: ICD-10-CM

## 2019-04-12 DIAGNOSIS — F41.1 GAD (GENERALIZED ANXIETY DISORDER): Primary | ICD-10-CM

## 2019-04-12 PROCEDURE — 90834 PSYTX W PT 45 MINUTES: CPT | Performed by: PSYCHOLOGIST

## 2019-04-12 ASSESSMENT — PATIENT HEALTH QUESTIONNAIRE - PHQ9
SUM OF ALL RESPONSES TO PHQ QUESTIONS 1-9: 9
5. POOR APPETITE OR OVEREATING: 1
7. TROUBLE CONCENTRATING ON THINGS, SUCH AS READING THE NEWSPAPER OR WATCHING TELEVISION: 0
8. MOVING OR SPEAKING SO SLOWLY THAT OTHER PEOPLE COULD HAVE NOTICED. OR THE OPPOSITE, BEING SO FIGETY OR RESTLESS THAT YOU HAVE BEEN MOVING AROUND A LOT MORE THAN USUAL: 0
3. TROUBLE FALLING OR STAYING ASLEEP: 2
9. THOUGHTS THAT YOU WOULD BE BETTER OFF DEAD, OR OF HURTING YOURSELF: 0
1. LITTLE INTEREST OR PLEASURE IN DOING THINGS: 2
4. FEELING TIRED OR HAVING LITTLE ENERGY: 2
10. IF YOU CHECKED OFF ANY PROBLEMS, HOW DIFFICULT HAVE THESE PROBLEMS MADE IT FOR YOU TO DO YOUR WORK, TAKE CARE OF THINGS AT HOME, OR GET ALONG WITH OTHER PEOPLE: 1
SUM OF ALL RESPONSES TO PHQ QUESTIONS 1-9: 9
2. FEELING DOWN, DEPRESSED OR HOPELESS: 1
SUM OF ALL RESPONSES TO PHQ9 QUESTIONS 1 & 2: 3
6. FEELING BAD ABOUT YOURSELF - OR THAT YOU ARE A FAILURE OR HAVE LET YOURSELF OR YOUR FAMILY DOWN: 1

## 2019-04-12 ASSESSMENT — ANXIETY QUESTIONNAIRES
GAD7 TOTAL SCORE: 6
2. NOT BEING ABLE TO STOP OR CONTROL WORRYING: 2-OVER HALF THE DAYS
4. TROUBLE RELAXING: 1-SEVERAL DAYS
1. FEELING NERVOUS, ANXIOUS, OR ON EDGE: 1-SEVERAL DAYS
3. WORRYING TOO MUCH ABOUT DIFFERENT THINGS: 1-SEVERAL DAYS
5. BEING SO RESTLESS THAT IT IS HARD TO SIT STILL: 0-NOT AT ALL SURE
7. FEELING AFRAID AS IF SOMETHING AWFUL MIGHT HAPPEN: 0-NOT AT ALL SURE
6. BECOMING EASILY ANNOYED OR IRRITABLE: 1-SEVERAL DAYS

## 2019-04-12 NOTE — PROGRESS NOTES
Behavioral Health Consultation Follow-up  Og Ortiz PsyD  Psychologist  4/12/2019  3:06 PM      Time spent with Patient: 45 minutes  This is patient's 11 th  [facility] appointment. Reason for Consult:  BLANCA, PDD  Referring Provider: Rosy Beauchamp MD  [address] [facility], 122 [facility]    Feedback given to PCP. S:    Last appt: 3/29. More processing of normal grief reaction as she takes steps for divorce. Signed papers in park this past Tuesday. Going to [facility] for 3 days next week with girlfriends. Stressor: worried about sister who is \"down\" now.     O:  MSE:    Appearance    Teary eyed at times, alert, cooperative  Appetite okay  Sleep disturbance Yes  Fatigue Yes  Loss of pleasure Yes  Impulsive behavior No  Speech    normal rate, normal volume and well articulated  Mood    Sad about state of marriage  Affect    Congruent with full range  Thought Content    intact  Thought Process    linear, goal directed and coherent  Associations    logical connections  Insight    Good  Judgment    Intact  Orientation    oriented to person, place, time, and general circumstances  Memory    recent and remote memory intact  Attention/Concentration    intact  Morbid ideation No  Suicide Assessment    no suicidal ideation      History:    Medications:   Current Outpatient Medications   Medication Sig Dispense Refill    amLODIPine (NORVASC) 5 MG tablet TAKE ONE TABLET BY MOUTH DAILY 30 tablet 5    FLUoxetine (PROZAC) 40 MG capsule TAKE ONE CAPSULE BY MOUTH DAILY 30 capsule 11    VENTOLIN  (90 Base) MCG/ACT inhaler INHALE TWO PUFFS BY MOUTH EVERY 4 HOURS AS NEEDED FOR WHEEZING 1 Inhaler 4    Cholecalciferol (VITAMIN D3) 2000 UNITS CAPS Take 2,000 Units by mouth three times a week      Nutritional Supplements (JUICE PLUS FIBRE PO) Take 1.5 g by mouth daily Indications: Takes Juice Plus      Maitake Mushroom POWD 500 mg by Does not apply route daily      aspirin-acetaminophen-caffeine (EXCEDRIN MIGRAINE) 250-250-65 MG per tablet Take 1 tablet by mouth every 6 hours as needed for Headaches      cetirizine (ZYRTEC) 10 MG tablet Take 10 mg by mouth daily      letrozole (FEMARA) 2.5 MG tablet Take 2.5 mg by mouth daily. 4 times a week      ALPRAZolam (XANAX) 0.5 MG tablet Take 0.5 mg by mouth as needed. No current facility-administered medications for this visit. Social History:   Social History     Socioeconomic History    Marital status:      Spouse name: Not on file    Number of children: 0    Years of education: Not on file    Highest education level: Not on file   Occupational History    Not on file   Social Needs    Financial resource strain: Not on file    Food insecurity:     Worry: Not on file     Inability: Not on file    Transportation needs:     Medical: Not on file     Non-medical: Not on file   Tobacco Use    Smoking status: Never Smoker    Smokeless tobacco: Never Used   Substance and Sexual Activity    Alcohol use: Yes     Comment: wine on weekends only    Drug use: No    Sexual activity: Not on file   Lifestyle    Physical activity:     Days per week: Not on file     Minutes per session: Not on file    Stress: Not on file   Relationships    Social connections:     Talks on phone: Not on file     Gets together: Not on file     Attends Congregational service: Not on file     Active member of club or organization: Not on file     Attends meetings of clubs or organizations: Not on file     Relationship status: Not on file    Intimate partner violence:     Fear of current or ex partner: Not on file     Emotionally abused: Not on file     Physically abused: Not on file     Forced sexual activity: Not on file   Other Topics Concern    Not on file   Social History Narrative    Not on file       TOBACCO:   reports that she has never smoked. She has never used smokeless tobacco.  ETOH:   reports that she drinks alcohol.     Family History:   Family History   Problem Relation Age of Onset    Other Mother         MVP, Panic Disorder    Coronary Art Dis Father     High Blood Pressure Father     Heart Disease Father 36        MI    Diabetes Father 68        Type 2    Cancer Brother         Melanoma    High Blood Pressure Brother     Cancer Sister         Melanoma in situ    High Blood Pressure Sister     Brain Cancer Sister         Glioblastoma    Colon Cancer Paternal Grandmother          A:    See S: above    PHQ Scores 4/12/2019 3/29/2019 3/12/2019 1/22/2019 12/21/2018 11/20/2018 10/30/2018   PHQ2 Score 3 4 6 0 4 2 1   PHQ9 Score 9 11 19 0 15 2 1     Interpretation of Total Score Depression Severity: 1-4 = Minimal depression, 5-9 = Mild depression, 10-14 = Moderate depression, 15-19 = Moderately severe depression, 20-27 = Severe depression    BLANCA 7 SCORE 4/12/2019 3/29/2019 3/12/2019 1/22/2019 12/21/2018 11/20/2018 10/30/2018   BLANCA-7 Total Score 6 15 16 2 18 7 6     Interpretation of BLANCA-7 score: 5-9 = mild anxiety, 10-14 = moderate anxiety, 15+ = severe anxiety. Recommend referral to behavioral health for scores 10 or greater. Denied any si/hi risk. Diagnosis:    BLANCA, PDD      Diagnosis Date    Allergic rhinitis     Asthma     [facility]    [facility] of blood clots 01/2013    Lungs    Miscarriage 2004    Premenstrual dysphoric syndrome     Pulmonary embolus ([facility] [address] 1/2013     Problems with primary support group and Problems related to the social environment    Plan:  Pt interventions:    Practiced assertive communication, Trained in strategies for increasing balanced thinking and Discussed self-care (sleep, nutrition, rewarding activities, social support, exercise)    Pt Behavioral Change Plan:    1. Continue to stay connected socially, spend time with girlfriends for fun and relaxation  2. Slow down, rest any amount  3. Continue fluoxetine as prescribed per Dr Karne Gray  4. Continue to maintain emotional boundaries with   5.  Go to [place] for fun next week! 6.  Return to clinic for Dr Cassidy Chance in 2-3 weeks

## 2019-04-19 NOTE — PATIENT INSTRUCTIONS
1. Continue to stay connected socially, spend time with girlfriends for fun and relaxation  2. Slow down, rest any amount  3. Continue fluoxetine as prescribed per Dr Karen Woodard  4. Continue to maintain emotional boundaries with   5. Go to [facility] for fun next week! 6.  Return to clinic for Dr Jonny Borja in 2-3 weeks

## 2019-05-03 ENCOUNTER — TELEPHONE (OUTPATIENT)
Dept: PSYCHOLOGY | Age: 54
End: 2019-05-03

## 2019-05-03 ENCOUNTER — OFFICE VISIT (OUTPATIENT)
Dept: PSYCHOLOGY | Age: 54
End: 2019-05-03
Payer: COMMERCIAL

## 2019-05-03 DIAGNOSIS — F41.1 GAD (GENERALIZED ANXIETY DISORDER): Primary | ICD-10-CM

## 2019-05-03 DIAGNOSIS — F34.1 PERSISTENT DEPRESSIVE DISORDER: ICD-10-CM

## 2019-05-03 PROCEDURE — 90832 PSYTX W PT 30 MINUTES: CPT | Performed by: PSYCHOLOGIST

## 2019-05-03 ASSESSMENT — PATIENT HEALTH QUESTIONNAIRE - PHQ9
8. MOVING OR SPEAKING SO SLOWLY THAT OTHER PEOPLE COULD HAVE NOTICED. OR THE OPPOSITE, BEING SO FIGETY OR RESTLESS THAT YOU HAVE BEEN MOVING AROUND A LOT MORE THAN USUAL: 0
SUM OF ALL RESPONSES TO PHQ9 QUESTIONS 1 & 2: 3
6. FEELING BAD ABOUT YOURSELF - OR THAT YOU ARE A FAILURE OR HAVE LET YOURSELF OR YOUR FAMILY DOWN: 1
10. IF YOU CHECKED OFF ANY PROBLEMS, HOW DIFFICULT HAVE THESE PROBLEMS MADE IT FOR YOU TO DO YOUR WORK, TAKE CARE OF THINGS AT HOME, OR GET ALONG WITH OTHER PEOPLE: 1
SUM OF ALL RESPONSES TO PHQ QUESTIONS 1-9: 11
4. FEELING TIRED OR HAVING LITTLE ENERGY: 2
7. TROUBLE CONCENTRATING ON THINGS, SUCH AS READING THE NEWSPAPER OR WATCHING TELEVISION: 1
2. FEELING DOWN, DEPRESSED OR HOPELESS: 2
3. TROUBLE FALLING OR STAYING ASLEEP: 2
1. LITTLE INTEREST OR PLEASURE IN DOING THINGS: 1
5. POOR APPETITE OR OVEREATING: 2
9. THOUGHTS THAT YOU WOULD BE BETTER OFF DEAD, OR OF HURTING YOURSELF: 0
SUM OF ALL RESPONSES TO PHQ QUESTIONS 1-9: 11

## 2019-05-03 ASSESSMENT — ANXIETY QUESTIONNAIRES
1. FEELING NERVOUS, ANXIOUS, OR ON EDGE: 2-OVER HALF THE DAYS
3. WORRYING TOO MUCH ABOUT DIFFERENT THINGS: 3-NEARLY EVERY DAY
4. TROUBLE RELAXING: 2-OVER HALF THE DAYS
GAD7 TOTAL SCORE: 14
6. BECOMING EASILY ANNOYED OR IRRITABLE: 2-OVER HALF THE DAYS
7. FEELING AFRAID AS IF SOMETHING AWFUL MIGHT HAPPEN: 1-SEVERAL DAYS
2. NOT BEING ABLE TO STOP OR CONTROL WORRYING: 3-NEARLY EVERY DAY
5. BEING SO RESTLESS THAT IT IS HARD TO SIT STILL: 1-SEVERAL DAYS

## 2019-05-03 NOTE — PROGRESS NOTES
Behavioral Health Consultation Follow-up  Perlita Lopez PsyD  Psychologist  5/3/2019  2:50 PM      Time spent with Patient: 25 minutes  This is patient's 12 th  [facility] appointment. Reason for Consult:  BLANCA, PDD  Referring Provider: Dr David De Dios MD    Feedback given to PCP. S:    Last appt:4/12. Stress levels up for various reasons. Bitter sweet, she is closing for new house on 5/31. But her divorce court date is the day before: 5/30. [facility] on 5/18 which is same day as her wedding anniversary so she will be busy which we agreed will help support her grief recovery.      O:  MSE:    Appearance    alert, cooperative, crying  Appetite okay  Sleep disturbance Yes  Fatigue Yes  Loss of pleasure Yes  Impulsive behavior No  Speech    normal rate, normal volume and well articulated  Mood    Stress levels up  Affect    Congruent with full range  Thought Content    intact  Thought Process    linear, goal directed and coherent  Associations    logical connections  Insight    Good  Judgment    Intact  Orientation    oriented to person, place, time, and general circumstances  Memory    recent and remote memory intact  Attention/Concentration    intact  Morbid ideation No  Suicide Assessment    no suicidal ideation      History:    Medications:   Current Outpatient Medications   Medication Sig Dispense Refill    amLODIPine (NORVASC) 5 MG tablet TAKE ONE TABLET BY MOUTH DAILY 30 tablet 5    FLUoxetine (PROZAC) 40 MG capsule TAKE ONE CAPSULE BY MOUTH DAILY 30 capsule 11    VENTOLIN  (90 Base) MCG/ACT inhaler INHALE TWO PUFFS BY MOUTH EVERY 4 HOURS AS NEEDED FOR WHEEZING 1 Inhaler 4    Cholecalciferol (VITAMIN D3) 2000 UNITS CAPS Take 2,000 Units by mouth three times a week      Nutritional Supplements (JUICE PLUS FIBRE PO) Take 1.5 g by mouth daily Indications: Takes Juice Plus      Maitake Mushroom POWD 500 mg by Does not apply route daily      aspirin-acetaminophen-caffeine (EXCEDRIN MIGRAINE) 250-250-65 MG per tablet Take 1 tablet by mouth every 6 hours as needed for Headaches      cetirizine (ZYRTEC) 10 MG tablet Take 10 mg by mouth daily      letrozole (FEMARA) 2.5 MG tablet Take 2.5 mg by mouth daily. 4 times a week      ALPRAZolam (XANAX) 0.5 MG tablet Take 0.5 mg by mouth as needed. No current facility-administered medications for this visit. Social History:   Social History     Socioeconomic History    Marital status:      Spouse name: Not on file    Number of children: 0    Years of education: Not on file    Highest education level: Not on file   Occupational History    Not on file   Social Needs    Financial resource strain: Not on file    Food insecurity:     Worry: Not on file     Inability: Not on file    Transportation needs:     Medical: Not on file     Non-medical: Not on file   Tobacco Use    Smoking status: Never Smoker    Smokeless tobacco: Never Used   Substance and Sexual Activity    Alcohol use: Yes     Comment: wine on weekends only    Drug use: No    Sexual activity: Not on file   Lifestyle    Physical activity:     Days per week: Not on file     Minutes per session: Not on file    Stress: Not on file   Relationships    Social connections:     Talks on phone: Not on file     Gets together: Not on file     Attends Yarsanism service: Not on file     Active member of club or organization: Not on file     Attends meetings of clubs or organizations: Not on file     Relationship status: Not on file    Intimate partner violence:     Fear of current or ex partner: Not on file     Emotionally abused: Not on file     Physically abused: Not on file     Forced sexual activity: Not on file   Other Topics Concern    Not on file   Social History Narrative    Not on file       TOBACCO:   reports that she has never smoked. She has never used smokeless tobacco.  ETOH:   reports that she drinks alcohol.     Family History:   Family History   Problem Relation Age of Onset    Other Mother         MVP, Panic Disorder    Coronary Art Dis Father     High Blood Pressure Father     Heart Disease Father 36        MI    Diabetes Father 68        Type 2    Cancer Brother         Melanoma    High Blood Pressure Brother     Cancer Sister         Melanoma in situ    High Blood Pressure Sister     Brain Cancer Sister         Glioblastoma    Colon Cancer Paternal Grandmother        A:    See S: above    PHQ Scores 5/3/2019 4/12/2019 3/29/2019 3/12/2019 1/22/2019 12/21/2018 11/20/2018   PHQ2 Score 3 3 4 6 0 4 2   PHQ9 Score 11 9 11 19 0 15 2     Interpretation of Total Score Depression Severity: 1-4 = Minimal depression, 5-9 = Mild depression, 10-14 = Moderate depression, 15-19 = Moderately severe depression, 20-27 = Severe depression    BLANCA 7 SCORE 5/3/2019 4/12/2019 3/29/2019 3/12/2019 1/22/2019 12/21/2018 11/20/2018   BLANCA-7 Total Score 14 6 15 16 2 18 7     Interpretation of BLANCA-7 score: 5-9 = mild anxiety, 10-14 = moderate anxiety, 15+ = severe anxiety. Recommend referral to behavioral health for scores 10 or greater. Denied any si/hi risk.      Diagnosis:    BLANCA, Persistent depressive disorder       Diagnosis Date    Allergic rhinitis     Asthma     Breast cancer ([facility] [address] 11/2012    [facility] of blood clots 01/2013    Lungs    Miscarriage 2004    Premenstrual dysphoric syndrome     Pulmonary embolus ([facility] [address] 1/2013     Problems with primary support group and Problems related to the social environment    Plan:  Pt interventions:    Practiced assertive communication, Trained in strategies for increasing balanced thinking, Discussed self-care (sleep, nutrition, rewarding activities, social support, exercise) and Supportive techniques    Pt Behavioral Change Plan:    1. Have friend go with you to divorce court hearing on 5/30 for emotional support  2. Continue to maintain emotional boundaries with soon to be ex-  3. Stay connected socially   4.  Go to [facility] on anniversary  5.  Return to clinic for Dr Adrienne Childress in 2 weeks

## 2019-05-03 NOTE — TELEPHONE ENCOUNTER
Patient is asking if there is any possibility of getting in to see Dr. Cliff Tee today  Kermit Donaldson DESMOND#[phone]    Please Advise

## 2019-05-17 NOTE — PATIENT INSTRUCTIONS
1. Have friend go with you to divorce court hearing on 5/30 for emotional support  2. Continue to maintain emotional boundaries with soon to be ex-  3. Stay connected socially   4. Go to [facility] on anniversary  5.  Return to clinic for Dr Marti Oliva in 2 weeks

## 2019-06-04 ENCOUNTER — OFFICE VISIT (OUTPATIENT)
Dept: PSYCHOLOGY | Age: 54
End: 2019-06-04
Payer: COMMERCIAL

## 2019-06-04 DIAGNOSIS — F34.1 PERSISTENT DEPRESSIVE DISORDER: ICD-10-CM

## 2019-06-04 DIAGNOSIS — F41.1 GAD (GENERALIZED ANXIETY DISORDER): Primary | ICD-10-CM

## 2019-06-04 PROCEDURE — 90832 PSYTX W PT 30 MINUTES: CPT | Performed by: PSYCHOLOGIST

## 2019-06-04 ASSESSMENT — PATIENT HEALTH QUESTIONNAIRE - PHQ9
SUM OF ALL RESPONSES TO PHQ9 QUESTIONS 1 & 2: 4
8. MOVING OR SPEAKING SO SLOWLY THAT OTHER PEOPLE COULD HAVE NOTICED. OR THE OPPOSITE, BEING SO FIGETY OR RESTLESS THAT YOU HAVE BEEN MOVING AROUND A LOT MORE THAN USUAL: 0
5. POOR APPETITE OR OVEREATING: 2
2. FEELING DOWN, DEPRESSED OR HOPELESS: 2
9. THOUGHTS THAT YOU WOULD BE BETTER OFF DEAD, OR OF HURTING YOURSELF: 0
3. TROUBLE FALLING OR STAYING ASLEEP: 2
6. FEELING BAD ABOUT YOURSELF - OR THAT YOU ARE A FAILURE OR HAVE LET YOURSELF OR YOUR FAMILY DOWN: 2
1. LITTLE INTEREST OR PLEASURE IN DOING THINGS: 2
SUM OF ALL RESPONSES TO PHQ QUESTIONS 1-9: 13
10. IF YOU CHECKED OFF ANY PROBLEMS, HOW DIFFICULT HAVE THESE PROBLEMS MADE IT FOR YOU TO DO YOUR WORK, TAKE CARE OF THINGS AT HOME, OR GET ALONG WITH OTHER PEOPLE: 2
4. FEELING TIRED OR HAVING LITTLE ENERGY: 2
7. TROUBLE CONCENTRATING ON THINGS, SUCH AS READING THE NEWSPAPER OR WATCHING TELEVISION: 1
SUM OF ALL RESPONSES TO PHQ QUESTIONS 1-9: 13

## 2019-06-04 ASSESSMENT — ANXIETY QUESTIONNAIRES
1. FEELING NERVOUS, ANXIOUS, OR ON EDGE: 3-NEARLY EVERY DAY
3. WORRYING TOO MUCH ABOUT DIFFERENT THINGS: 3-NEARLY EVERY DAY
4. TROUBLE RELAXING: 2-OVER HALF THE DAYS
2. NOT BEING ABLE TO STOP OR CONTROL WORRYING: 3-NEARLY EVERY DAY
7. FEELING AFRAID AS IF SOMETHING AWFUL MIGHT HAPPEN: 1-SEVERAL DAYS
6. BECOMING EASILY ANNOYED OR IRRITABLE: 1-SEVERAL DAYS
5. BEING SO RESTLESS THAT IT IS HARD TO SIT STILL: 0-NOT AT ALL SURE
GAD7 TOTAL SCORE: 13

## 2019-06-04 NOTE — PROGRESS NOTES
Behavioral Health Consultation Follow-up  Akash Christianson PsyD  Psychologist  6/4/2019  10:23 AM      Time spent with Patient: 30 minutes  This is patient's first  [facility] appointment. Reason for Consult:  BLANCA, Persistent depressive disorder   Referring Provider: Edwige Olvera MD  [address] [place], [address]    Feedback given to PCP. S:    Last appt: 5/3. Divorce was emotional, friend went with her to court, very supportive. Closing for house: addendum until tomorrow due to divorce decree needed to be processed. Pt sister's connected her to court so that possible they can facilitate the processing of her divorce paperwork. Great news: during our appt she got phone call that divorce decree had been finalized and ready for her to  today. This is great news since she was concerned about the divorce getting in the way of closing, and now it won't! Will  divorce decree after this appt.       O:  MSE:    Appearance    Tearful at times, alert, cooperative  Appetite normal  Sleep disturbance off and on  Fatigue Yes  Loss of pleasure Yes  Impulsive behavior No  Speech    normal rate, normal volume and well articulated  Mood    Stressed with closing  Affect    Congruent with full rnage  Thought Content    intact  Thought Process    linear, goal directed and coherent  Associations    logical connections  Insight    Good  Judgment    Intact  Orientation    oriented to person, place, time, and general circumstances  Memory    recent and remote memory intact  Attention/Concentration    intact  Morbid ideation No  Suicide Assessment    no suicidal ideation      History:    Medications:   Current Outpatient Medications   Medication Sig Dispense Refill    amLODIPine (NORVASC) 5 MG tablet TAKE ONE TABLET BY MOUTH DAILY 30 tablet 5    FLUoxetine (PROZAC) 40 MG capsule TAKE ONE CAPSULE BY MOUTH DAILY 30 capsule 11    VENTOLIN  (90 Base) MCG/ACT inhaler INHALE TWO PUFFS BY MOUTH EVERY 4 abused: Not on file     Physically abused: Not on file     Forced sexual activity: Not on file   Other Topics Concern    Not on file   Social History Narrative    Not on file       TOBACCO:   reports that she has never smoked. She has never used smokeless tobacco.  ETOH:   reports that she drinks alcohol. Family History:   Family History   Problem Relation Age of Onset    Other Mother         MVP, Panic Disorder    Coronary Art Dis Father     High Blood Pressure Father     Heart Disease Father 36        MI    Diabetes Father 68        Type 2    Cancer Brother         Melanoma    High Blood Pressure Brother     Cancer Sister         Melanoma in situ    High Blood Pressure Sister     Brain Cancer Sister         Glioblastoma    Colon Cancer Paternal Grandmother      A:    See S: above    PHQ Scores 6/4/2019 5/3/2019 4/12/2019 3/29/2019 3/12/2019 1/22/2019 12/21/2018   PHQ2 Score 4 3 3 4 6 0 4   PHQ9 Score 13 11 9 11 19 0 15     Interpretation of Total Score Depression Severity: 1-4 = Minimal depression, 5-9 = Mild depression, 10-14 = Moderate depression, 15-19 = Moderately severe depression, 20-27 = Severe depression    BLANCA 7 SCORE 6/4/2019 5/3/2019 4/12/2019 3/29/2019 3/12/2019 1/22/2019 12/21/2018   BLANCA-7 Total Score 13 14 6 15 16 2 18     Interpretation of BLANCA-7 score: 5-9 = mild anxiety, 10-14 = moderate anxiety, 15+ = severe anxiety. Recommend referral to behavioral health for scores 10 or greater. Denied any si/hi risk.     Diagnosis:    BLANCA, Persistent depressive disorder       Diagnosis Date    Allergic rhinitis     Asthma     Breast cancer ([facility] [address] 11/2012    [facility] of blood clots 01/2013    Lungs    Miscarriage 2004    Premenstrual dysphoric syndrome     Pulmonary embolus ([facility] [address] 1/2013     Problems with primary support group and Problems related to the social environment    Plan:  Pt interventions:    Practiced assertive communication, Trained in strategies for increasing balanced

## 2019-06-10 NOTE — PATIENT INSTRUCTIONS
1.  divorce decree after our appointment  2. Closing next week, enjoy new home! 3. Stay connected socially   4.  Return to clinic for Dr Liam Vargas in 2 weeks

## 2019-06-21 ENCOUNTER — OFFICE VISIT (OUTPATIENT)
Dept: PSYCHOLOGY | Age: 54
End: 2019-06-21
Payer: COMMERCIAL

## 2019-06-21 DIAGNOSIS — F34.1 PERSISTENT DEPRESSIVE DISORDER: ICD-10-CM

## 2019-06-21 DIAGNOSIS — F41.1 GAD (GENERALIZED ANXIETY DISORDER): Primary | ICD-10-CM

## 2019-06-21 PROCEDURE — 90834 PSYTX W PT 45 MINUTES: CPT | Performed by: PSYCHOLOGIST

## 2019-06-21 ASSESSMENT — PATIENT HEALTH QUESTIONNAIRE - PHQ9
SUM OF ALL RESPONSES TO PHQ9 QUESTIONS 1 & 2: 2
SUM OF ALL RESPONSES TO PHQ QUESTIONS 1-9: 2
2. FEELING DOWN, DEPRESSED OR HOPELESS: 1
SUM OF ALL RESPONSES TO PHQ QUESTIONS 1-9: 2
1. LITTLE INTEREST OR PLEASURE IN DOING THINGS: 1

## 2019-06-21 ASSESSMENT — ANXIETY QUESTIONNAIRES
6. BECOMING EASILY ANNOYED OR IRRITABLE: 1-SEVERAL DAYS
7. FEELING AFRAID AS IF SOMETHING AWFUL MIGHT HAPPEN: 1-SEVERAL DAYS
3. WORRYING TOO MUCH ABOUT DIFFERENT THINGS: 2-OVER HALF THE DAYS
5. BEING SO RESTLESS THAT IT IS HARD TO SIT STILL: 0-NOT AT ALL SURE
GAD7 TOTAL SCORE: 8
1. FEELING NERVOUS, ANXIOUS, OR ON EDGE: 1-SEVERAL DAYS
4. TROUBLE RELAXING: 1-SEVERAL DAYS
2. NOT BEING ABLE TO STOP OR CONTROL WORRYING: 2-OVER HALF THE DAYS

## 2019-06-21 NOTE — PROGRESS NOTES
Behavioral Health Consultation Follow-up  Sp Porter PsyD  Psychologist  6/21/2019  9:04 AM      Time spent with Patient: 40 minutes  This is patient's second  [facility] appointment. Reason for Consult:  BLANCA, Persistent depressive disorder   Referring Provider: Kiki Gage MD  [address]  [facility] [place], [address]    Feedback given to PCP. S:    Last appt: 6/4. Closed house on 6/5!! Overall doing well, bittersweet feelings about new home and celebrating this in the midst of her sister's ongoing sumner with her cancer. Explored how she can assertively share these mixed feelings with her sister and how this could be less of a burden than she thinks and give her sister a chance to be a sister, which could give her a sense of normalcy in her life.      New home: moved in but big furniture will not be moved in until this week    O:  MSE:    Appearance    Tearful at times, alert, cooperative  Appetite normal  Sleep disturbance Yes  Fatigue Yes  Loss of pleasure off and on  Impulsive behavior No  Speech    normal rate, normal volume and well articulated  Mood    Stressed about moving into new home  Affect    Congruent with full range  Thought Content    intact  Thought Process    linear, goal directed and coherent  Associations    logical connections  Insight    Good  Judgment    Intact  Orientation    oriented to person, place, time, and general circumstances  Memory    recent and remote memory intact  Attention/Concentration    intact  Morbid ideation No  Suicide Assessment    no suicidal ideation      History:    Medications:   Current Outpatient Medications   Medication Sig Dispense Refill    amLODIPine (NORVASC) 5 MG tablet TAKE ONE TABLET BY MOUTH DAILY 30 tablet 5    FLUoxetine (PROZAC) 40 MG capsule TAKE ONE CAPSULE BY MOUTH DAILY 30 capsule 11    VENTOLIN  (90 Base) MCG/ACT inhaler INHALE TWO PUFFS BY MOUTH EVERY 4 HOURS AS NEEDED FOR WHEEZING 1 Inhaler 4    Cholecalciferol (VITAMIN D3) 2000 UNITS CAPS Take 2,000 Units by mouth three times a week      Nutritional Supplements (JUICE PLUS FIBRE PO) Take 1.5 g by mouth daily Indications: Takes Juice Plus      Maitake Mushroom POWD 500 mg by Does not apply route daily      aspirin-acetaminophen-caffeine (EXCEDRIN MIGRAINE) 250-250-65 MG per tablet Take 1 tablet by mouth every 6 hours as needed for Headaches      cetirizine (ZYRTEC) 10 MG tablet Take 10 mg by mouth daily      letrozole (FEMARA) 2.5 MG tablet Take 2.5 mg by mouth daily. 4 times a week      ALPRAZolam (XANAX) 0.5 MG tablet Take 0.5 mg by mouth as needed. No current facility-administered medications for this visit.         Social History:   Social History     Socioeconomic History    Marital status: Single     Spouse name: Not on file    Number of children: 0    Years of education: Not on file    Highest education level: Not on file   Occupational History    Not on file   Social Needs    Financial resource strain: Not on file    Food insecurity:     Worry: Not on file     Inability: Not on file    Transportation needs:     Medical: Not on file     Non-medical: Not on file   Tobacco Use    Smoking status: Never Smoker    Smokeless tobacco: Never Used   Substance and Sexual Activity    Alcohol use: Yes     Comment: wine on weekends only    Drug use: No    Sexual activity: Not on file   Lifestyle    Physical activity:     Days per week: Not on file     Minutes per session: Not on file    Stress: Not on file   Relationships    Social connections:     Talks on phone: Not on file     Gets together: Not on file     Attends Nondenominational service: Not on file     Active member of club or organization: Not on file     Attends meetings of clubs or organizations: Not on file     Relationship status: Not on file    Intimate partner violence:     Fear of current or ex partner: Not on file     Emotionally abused: Not on file     Physically abused: Not on file     Forced sexual activity: Not on file   Other Topics Concern    Not on file   Social History Narrative    Not on file       TOBACCO:   reports that she has never smoked. She has never used smokeless tobacco.  ETOH:   reports that she drinks alcohol. Family History:   Family History   Problem Relation Age of Onset    Other Mother         MVP, Panic Disorder    Coronary Art Dis Father     High Blood Pressure Father     Heart Disease Father 36        MI    Diabetes Father 68        Type 2    Cancer Brother         Melanoma    High Blood Pressure Brother     Cancer Sister         Melanoma in situ    High Blood Pressure Sister     Brain Cancer Sister         Glioblastoma    Colon Cancer Paternal Grandmother      A:    See S: above    PHQ Scores 6/21/2019 6/4/2019 5/3/2019 4/12/2019 3/29/2019 3/12/2019 1/22/2019   PHQ2 Score 2 4 3 3 4 6 0   PHQ9 Score 2 13 11 9 11 19 0     Interpretation of Total Score Depression Severity: 1-4 = Minimal depression, 5-9 = Mild depression, 10-14 = Moderate depression, 15-19 = Moderately severe depression, 20-27 = Severe depression    BLANCA 7 SCORE 6/21/2019 6/4/2019 5/3/2019 4/12/2019 3/29/2019 3/12/2019 1/22/2019   BLANCA-7 Total Score 8 13 14 6 15 16 2     Interpretation of BLANCA-7 score: 5-9 = mild anxiety, 10-14 = moderate anxiety, 15+ = severe anxiety. Recommend referral to behavioral health for scores 10 or greater. No si/hi risk.      Diagnosis:    BLANCA, Persistent depressive disorder       Diagnosis Date    Allergic rhinitis     Asthma     Breast cancer ([facility] [address] 11/2012    [place]    Hx of blood clots 01/2013    Lungs    Miscarriage 2004    Premenstrual dysphoric syndrome     Pulmonary embolus ([facility] [address] 1/2013     Problems related to the social environment    Plan:  Pt interventions:    Practiced assertive communication, Trained in strategies for increasing balanced thinking, Discussed self-care (sleep, nutrition, rewarding activities, social support, exercise), Discussed and problem-solved barriers in adhering to behavioral change plan and Supportive techniques    Pt Behavioral Change Plan:    1. Continue to stay connected socially  2. Continue to take fluoxetine as prescribed, go to Dr Gregory Mesa on 7/8  3. Continue to get settled into new home! 4. Consider starting yoga class for stress reduction, [facility] close to new home  5.  RTC for Dr Eliza Mcclelland on 7/9

## 2019-06-24 NOTE — PATIENT INSTRUCTIONS
1. Continue to stay connected socially  2. Continue to take fluoxetine as prescribed, go to Dr Ron Cabrera on 7/8  3. Continue to get settled into new home! 4. Consider starting yoga class for stress reduction, [facility] close to new home  5.  RTC for Dr Clarence Drew on 7/9

## 2019-07-23 ENCOUNTER — OFFICE VISIT (OUTPATIENT)
Dept: PSYCHOLOGY | Age: 54
End: 2019-07-23
Payer: COMMERCIAL

## 2019-07-23 DIAGNOSIS — F34.1 PERSISTENT DEPRESSIVE DISORDER: ICD-10-CM

## 2019-07-23 DIAGNOSIS — F41.1 GAD (GENERALIZED ANXIETY DISORDER): Primary | ICD-10-CM

## 2019-07-23 PROCEDURE — 90832 PSYTX W PT 30 MINUTES: CPT | Performed by: PSYCHOLOGIST

## 2019-07-23 ASSESSMENT — PATIENT HEALTH QUESTIONNAIRE - PHQ9
1. LITTLE INTEREST OR PLEASURE IN DOING THINGS: 3
10. IF YOU CHECKED OFF ANY PROBLEMS, HOW DIFFICULT HAVE THESE PROBLEMS MADE IT FOR YOU TO DO YOUR WORK, TAKE CARE OF THINGS AT HOME, OR GET ALONG WITH OTHER PEOPLE: 2
9. THOUGHTS THAT YOU WOULD BE BETTER OFF DEAD, OR OF HURTING YOURSELF: 2
2. FEELING DOWN, DEPRESSED OR HOPELESS: 3
6. FEELING BAD ABOUT YOURSELF - OR THAT YOU ARE A FAILURE OR HAVE LET YOURSELF OR YOUR FAMILY DOWN: 3
7. TROUBLE CONCENTRATING ON THINGS, SUCH AS READING THE NEWSPAPER OR WATCHING TELEVISION: 3
SUM OF ALL RESPONSES TO PHQ9 QUESTIONS 1 & 2: 6
SUM OF ALL RESPONSES TO PHQ QUESTIONS 1-9: 22
SUM OF ALL RESPONSES TO PHQ QUESTIONS 1-9: 22
4. FEELING TIRED OR HAVING LITTLE ENERGY: 2
8. MOVING OR SPEAKING SO SLOWLY THAT OTHER PEOPLE COULD HAVE NOTICED. OR THE OPPOSITE, BEING SO FIGETY OR RESTLESS THAT YOU HAVE BEEN MOVING AROUND A LOT MORE THAN USUAL: 1
5. POOR APPETITE OR OVEREATING: 3
3. TROUBLE FALLING OR STAYING ASLEEP: 2

## 2019-07-23 ASSESSMENT — ANXIETY QUESTIONNAIRES
3. WORRYING TOO MUCH ABOUT DIFFERENT THINGS: 3-NEARLY EVERY DAY
1. FEELING NERVOUS, ANXIOUS, OR ON EDGE: 3-NEARLY EVERY DAY
4. TROUBLE RELAXING: 3-NEARLY EVERY DAY
GAD7 TOTAL SCORE: 17
7. FEELING AFRAID AS IF SOMETHING AWFUL MIGHT HAPPEN: 2-OVER HALF THE DAYS
2. NOT BEING ABLE TO STOP OR CONTROL WORRYING: 3-NEARLY EVERY DAY
6. BECOMING EASILY ANNOYED OR IRRITABLE: 2-OVER HALF THE DAYS
5. BEING SO RESTLESS THAT IT IS HARD TO SIT STILL: 1-SEVERAL DAYS

## 2019-07-30 ENCOUNTER — OFFICE VISIT (OUTPATIENT)
Dept: PSYCHOLOGY | Age: 54
End: 2019-07-30
Payer: COMMERCIAL

## 2019-07-30 DIAGNOSIS — F41.1 GAD (GENERALIZED ANXIETY DISORDER): Primary | ICD-10-CM

## 2019-07-30 DIAGNOSIS — F34.1 PERSISTENT DEPRESSIVE DISORDER: ICD-10-CM

## 2019-07-30 PROBLEM — H16.423 PANNUS (CORNEAL), BILATERAL: Status: ACTIVE | Noted: 2019-07-30

## 2019-07-30 PROBLEM — H43.393 OTHER VITREOUS OPACITIES, BILATERAL: Status: ACTIVE | Noted: 2019-07-30

## 2019-07-30 PROBLEM — M25.50 AROMATASE INHIBITOR-ASSOCIATED ARTHRALGIA: Status: ACTIVE | Noted: 2017-01-30

## 2019-07-30 PROBLEM — M25.552 LEFT HIP PAIN: Status: ACTIVE | Noted: 2018-01-24

## 2019-07-30 PROBLEM — T45.1X5A AROMATASE INHIBITOR-ASSOCIATED ARTHRALGIA: Status: ACTIVE | Noted: 2017-01-30

## 2019-07-30 PROBLEM — F41.9 ANXIETY: Status: ACTIVE | Noted: 2018-04-25

## 2019-07-30 PROBLEM — H52.4 PRESBYOPIA: Status: ACTIVE | Noted: 2019-07-30

## 2019-07-30 PROCEDURE — 90832 PSYTX W PT 30 MINUTES: CPT | Performed by: PSYCHOLOGIST

## 2019-07-30 NOTE — PROGRESS NOTES
Behavioral Health Consultation Follow-up  Kristen Dunaway PsyD  Psychologist  7/30/2019  10:12 AM      Time spent with Patient: 30 minutes  This is patient's fourth  [facility] appointment. Reason for Consult:  BLANCA, Persistent depressive disorder   Referring Provider: Lynda Kim MD  [address] [place] [address]  [place], [address]    Feedback given to PCP. S:    Last appt: 7/23. 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 Going assertively to talk with boss after this appt. Rehearsed how she can ask for more support for her transition back to school.      O:  MSE:    Appearance    alert, cooperative  Appetite abnormal: poor  Sleep disturbance Yes  Fatigue Yes  Loss of pleasure Yes  Impulsive behavior No  Speech    normal rate, normal volume and well articulated  Mood    Anxious  Depressed  Affect    Congruent with full range  Thought Content    intact  Thought Process    linear, goal directed and coherent  Associations    logical connections  Insight    Good  Judgment    Intact  Orientation    oriented to person, place, time, and general circumstances  Memory    recent and remote memory intact  Attention/Concentration    intact  Morbid ideation No  Suicide Assessment    no suicidal ideation    History:    Medications:   Current Outpatient Medications   Medication Sig Dispense Refill    amLODIPine (NORVASC) 5 MG tablet TAKE ONE TABLET BY MOUTH DAILY 30 tablet 5    FLUoxetine (PROZAC) 40 MG capsule TAKE ONE CAPSULE BY MOUTH DAILY 30 capsule 11    VENTOLIN  (90 Base) MCG/ACT inhaler INHALE TWO PUFFS BY MOUTH EVERY 4 HOURS AS NEEDED FOR WHEEZING 1 Inhaler 4    Cholecalciferol (VITAMIN D3) 2000 UNITS CAPS Take 2,000 Units by mouth three times a week      Nutritional Supplements (JUICE PLUS FIBRE PO) Take 1.5 g by mouth daily Indications: Takes Juice Plus      Maitake Mushroom POWD 500 mg by Does not apply route daily      aspirin-acetaminophen-caffeine (EXCEDRIN MIGRAINE) 250-250-65 MG per tablet Take 1 tablet by mouth every 6 hours as needed for Headaches      cetirizine (ZYRTEC) 10 MG tablet Take 10 mg by mouth daily      letrozole (FEMARA) 2.5 MG tablet Take 2.5 mg by mouth daily. 4 times a week      ALPRAZolam (XANAX) 0.5 MG tablet Take 0.5 mg by mouth as needed. No current facility-administered medications for this visit. Social History:   Social History     Socioeconomic History    Marital status: Single     Spouse name: Not on file    Number of children: 0    Years of education: Not on file    Highest education level: Not on file   Occupational History    Not on file   Social Needs    Financial resource strain: Not on file    Food insecurity:     Worry: Not on file     Inability: Not on file    Transportation needs:     Medical: Not on file     Non-medical: Not on file   Tobacco Use    Smoking status: Never Smoker    Smokeless tobacco: Never Used   Substance and Sexual Activity    Alcohol use: Yes     Comment: wine on weekends only    Drug use: No    Sexual activity: Not on file   Lifestyle    Physical activity:     Days per week: Not on file     Minutes per session: Not on file    Stress: Not on file   Relationships    Social connections:     Talks on phone: Not on file     Gets together: Not on file     Attends Zoroastrianism service: Not on file     Active member of club or organization: Not on file     Attends meetings of clubs or organizations: Not on file     Relationship status: Not on file    Intimate partner violence:     Fear of current or ex partner: Not on file     Emotionally abused: Not on file     Physically abused: Not on file     Forced sexual activity: Not on file   Other Topics Concern    Not on file   Social History Narrative    Not on file       TOBACCO:   reports that she has never smoked.  She has never used smokeless down any amount  3. Spend time with mother as needed for emotional support  4.  Return to clinic for Dr Herrera [facility] next week

## 2019-07-31 RX ORDER — FLUOXETINE HYDROCHLORIDE 40 MG/1
CAPSULE ORAL
Qty: 30 CAPSULE | Refills: 10 | Status: SHIPPED | OUTPATIENT
Start: 2019-07-31 | End: 2020-08-17

## 2019-07-31 RX ORDER — AMLODIPINE BESYLATE 5 MG/1
TABLET ORAL
Qty: 30 TABLET | Refills: 4 | Status: SHIPPED | OUTPATIENT
Start: 2019-07-31 | End: 2020-01-26

## 2019-08-06 ENCOUNTER — OFFICE VISIT (OUTPATIENT)
Dept: PSYCHOLOGY | Age: 54
End: 2019-08-06
Payer: COMMERCIAL

## 2019-08-06 DIAGNOSIS — F34.1 PERSISTENT DEPRESSIVE DISORDER: ICD-10-CM

## 2019-08-06 DIAGNOSIS — F41.1 GAD (GENERALIZED ANXIETY DISORDER): Primary | ICD-10-CM

## 2019-08-06 PROCEDURE — 90832 PSYTX W PT 30 MINUTES: CPT | Performed by: PSYCHOLOGIST

## 2019-08-06 NOTE — PATIENT INSTRUCTIONS
1. Check out 21 day whole foods zander and the daily dozen zander to track fruits and vegetables  2. Drop off FMLA to the office, update and talk with Dr Pura Whelan about this  3. Continue to take psychiatric medication as prescribed, 9/3  4. School starts on 8/13, starts officially on 8/8   5.  Return to clinic for Dr Ksenia Ruiz in 2 weeks

## 2019-08-06 NOTE — PROGRESS NOTES
HOURS AS NEEDED FOR WHEEZING 1 Inhaler 4    Cholecalciferol (VITAMIN D3) 2000 UNITS CAPS Take 2,000 Units by mouth three times a week      Nutritional Supplements (JUICE PLUS FIBRE PO) Take 1.5 g by mouth daily Indications: Takes Juice Plus      Maitake Mushroom POWD 500 mg by Does not apply route daily      cetirizine (ZYRTEC) 10 MG tablet Take 10 mg by mouth daily      ALPRAZolam (XANAX) 0.5 MG tablet Take 0.5 mg by mouth as needed. No current facility-administered medications for this visit.         Social History:   Social History     Socioeconomic History    Marital status: Single     Spouse name: Not on file    Number of children: 0    Years of education: Not on file    Highest education level: Not on file   Occupational History    Not on file   Social Needs    Financial resource strain: Not on file    Food insecurity:     Worry: Not on file     Inability: Not on file    Transportation needs:     Medical: Not on file     Non-medical: Not on file   Tobacco Use    Smoking status: Never Smoker    Smokeless tobacco: Never Used   Substance and Sexual Activity    Alcohol use: Yes     Comment: wine on weekends only    Drug use: No    Sexual activity: Not on file   Lifestyle    Physical activity:     Days per week: Not on file     Minutes per session: Not on file    Stress: Not on file   Relationships    Social connections:     Talks on phone: Not on file     Gets together: Not on file     Attends Spiritism service: Not on file     Active member of club or organization: Not on file     Attends meetings of clubs or organizations: Not on file     Relationship status: Not on file    Intimate partner violence:     Fear of current or ex partner: Not on file     Emotionally abused: Not on file     Physically abused: Not on file     Forced sexual activity: Not on file   Other Topics Concern    Not on file   Social History Narrative    Not on file       TOBACCO:   reports that she has never smoked. She has never used smokeless tobacco.  ETOH:   reports that she drinks alcohol. Family History:   Family History   Problem Relation Age of Onset    Other Mother         MVP, Panic Disorder    Coronary Art Dis Father     High Blood Pressure Father     Heart Disease Father 36        MI    Diabetes Father 68        Type 2    Cancer Brother         Melanoma    High Blood Pressure Brother     Cancer Sister         Melanoma in situ    High Blood Pressure Sister     Brain Cancer Sister         Glioblastoma    Colon Cancer Paternal Grandmother        A:    Stress levels down (see PHQ-9 score below, down from 22 to 9). School starting. She did reach out to boss assertively to ask for support during the school year. This seems to have helped give her some relief. We continues to refine self-care skills and how she is going to sustain this through the school year. Wants to work on weight loss and nutrition. Provided a few resources. Encouraged her to go slow on this in the midst of stress. F/u with me in 2 weeks. PHQ Scores 8/7/2019 7/23/2019 6/21/2019 6/4/2019 5/3/2019 4/12/2019 3/29/2019   PHQ2 Score 2 6 2 4 3 3 4   PHQ9 Score 9 22 2 13 11 9 11     Interpretation of Total Score Depression Severity: 1-4 = Minimal depression, 5-9 = Mild depression, 10-14 = Moderate depression, 15-19 = Moderately severe depression, 20-27 = Severe depression    BLANCA 7 SCORE 8/7/2019 7/23/2019 6/21/2019 6/4/2019 5/3/2019 4/12/2019 3/29/2019   BLANCA-7 Total Score 4 17 8 13 14 6 15     Interpretation of BLANCA-7 score: 5-9 = mild anxiety, 10-14 = moderate anxiety, 15+ = severe anxiety. Recommend referral to behavioral health for scores 10 or greater. Denied any si/hi risk, intent, or plan.      Diagnosis:    BLANCA, Persistent depressive disorder       Diagnosis Date    Allergic rhinitis     Asthma     Breast cancer ([address] 11/2012    [place]    Hx of blood clots 01/2013    Lungs    Miscarriage 2004    Premenstrual dysphoric

## 2019-08-07 ASSESSMENT — ANXIETY QUESTIONNAIRES
7. FEELING AFRAID AS IF SOMETHING AWFUL MIGHT HAPPEN: 0-NOT AT ALL SURE
5. BEING SO RESTLESS THAT IT IS HARD TO SIT STILL: 0-NOT AT ALL SURE
6. BECOMING EASILY ANNOYED OR IRRITABLE: 2-OVER HALF THE DAYS
GAD7 TOTAL SCORE: 4
1. FEELING NERVOUS, ANXIOUS, OR ON EDGE: 1-SEVERAL DAYS
3. WORRYING TOO MUCH ABOUT DIFFERENT THINGS: 1-SEVERAL DAYS
2. NOT BEING ABLE TO STOP OR CONTROL WORRYING: 0-NOT AT ALL SURE
4. TROUBLE RELAXING: 0-NOT AT ALL SURE

## 2019-08-07 ASSESSMENT — PATIENT HEALTH QUESTIONNAIRE - PHQ9
10. IF YOU CHECKED OFF ANY PROBLEMS, HOW DIFFICULT HAVE THESE PROBLEMS MADE IT FOR YOU TO DO YOUR WORK, TAKE CARE OF THINGS AT HOME, OR GET ALONG WITH OTHER PEOPLE: 1
8. MOVING OR SPEAKING SO SLOWLY THAT OTHER PEOPLE COULD HAVE NOTICED. OR THE OPPOSITE, BEING SO FIGETY OR RESTLESS THAT YOU HAVE BEEN MOVING AROUND A LOT MORE THAN USUAL: 0
2. FEELING DOWN, DEPRESSED OR HOPELESS: 1
SUM OF ALL RESPONSES TO PHQ9 QUESTIONS 1 & 2: 2
6. FEELING BAD ABOUT YOURSELF - OR THAT YOU ARE A FAILURE OR HAVE LET YOURSELF OR YOUR FAMILY DOWN: 1
3. TROUBLE FALLING OR STAYING ASLEEP: 2
9. THOUGHTS THAT YOU WOULD BE BETTER OFF DEAD, OR OF HURTING YOURSELF: 0
SUM OF ALL RESPONSES TO PHQ QUESTIONS 1-9: 9
4. FEELING TIRED OR HAVING LITTLE ENERGY: 2
7. TROUBLE CONCENTRATING ON THINGS, SUCH AS READING THE NEWSPAPER OR WATCHING TELEVISION: 0
1. LITTLE INTEREST OR PLEASURE IN DOING THINGS: 1
5. POOR APPETITE OR OVEREATING: 2
SUM OF ALL RESPONSES TO PHQ QUESTIONS 1-9: 9

## 2019-08-20 ENCOUNTER — OFFICE VISIT (OUTPATIENT)
Dept: PSYCHOLOGY | Age: 54
End: 2019-08-20
Payer: COMMERCIAL

## 2019-08-20 DIAGNOSIS — F41.1 GAD (GENERALIZED ANXIETY DISORDER): Primary | ICD-10-CM

## 2019-08-20 DIAGNOSIS — F34.1 PERSISTENT DEPRESSIVE DISORDER: ICD-10-CM

## 2019-08-20 PROCEDURE — 90832 PSYTX W PT 30 MINUTES: CPT | Performed by: PSYCHOLOGIST

## 2019-08-20 NOTE — PROGRESS NOTES
Behavioral Health Consultation Follow-up  Euna Kussmaul, PsyD  Psychologist  8/20/2019  4:53 PM      Time spent with Patient: 30 minutes  This is patient's sixth  [facility] appointment. Reason for Consult:  BLANCA, Persistent depressive disorder   Referring Provider: Melvin Cutler MD  [address]  [facility] [place], [address]    Feedback given to PCP. S:    Last appt: 8/6. Pt did follow through with talking to boss and obtain FMLA. Gave me paperwork today. Will f/u with PCP about this, I will complete if he is in agreement. Some question today if medication adjustment is agitating her a bit, she will reach out to PCP to discuss. F/u with him on 9/3.      O:  MSE:    Appearance    Tearful at times, alert, cooperative  Appetite abnormal: poor  Sleep disturbance Yes  Fatigue better  Loss of pleasure better  Impulsive behavior No  Speech    normal rate, normal volume and well articulated  Mood    Stressed, sad about living alone  Affect    Congruent with full range  Thought Content    intact  Thought Process    linear, goal directed and coherent  Associations    logical connections  Insight    Good  Judgment    Intact  Orientation    oriented to person, place, time, and general circumstances  Memory    recent and remote memory intact  Attention/Concentration    intact  Morbid ideation No  Suicide Assessment    no suicidal ideation      History:    Medications:   Current Outpatient Medications   Medication Sig Dispense Refill    amLODIPine (NORVASC) 5 MG tablet TAKE ONE TABLET BY MOUTH DAILY 30 tablet 4    FLUoxetine (PROZAC) 40 MG capsule TAKE ONE CAPSULE BY MOUTH DAILY 30 capsule 10    buPROPion (WELLBUTRIN XL) 150 MG extended release tablet Take 1 tablet by mouth every morning 30 tablet 3    VENTOLIN  (90 Base) MCG/ACT inhaler INHALE TWO PUFFS BY MOUTH EVERY 4 HOURS AS NEEDED FOR WHEEZING 1 Inhaler 4    Cholecalciferol (VITAMIN D3) 2000 UNITS CAPS Take 2,000 Units by mouth three times a week     

## 2019-09-10 ENCOUNTER — OFFICE VISIT (OUTPATIENT)
Dept: PSYCHOLOGY | Age: 54
End: 2019-09-10
Payer: COMMERCIAL

## 2019-09-10 DIAGNOSIS — F34.1 PERSISTENT DEPRESSIVE DISORDER: ICD-10-CM

## 2019-09-10 DIAGNOSIS — F41.1 GAD (GENERALIZED ANXIETY DISORDER): Primary | ICD-10-CM

## 2019-09-10 PROCEDURE — 90834 PSYTX W PT 45 MINUTES: CPT | Performed by: PSYCHOLOGIST

## 2019-09-15 ASSESSMENT — PATIENT HEALTH QUESTIONNAIRE - PHQ9
1. LITTLE INTEREST OR PLEASURE IN DOING THINGS: 0
SUM OF ALL RESPONSES TO PHQ QUESTIONS 1-9: 1
2. FEELING DOWN, DEPRESSED OR HOPELESS: 1
SUM OF ALL RESPONSES TO PHQ9 QUESTIONS 1 & 2: 1
SUM OF ALL RESPONSES TO PHQ QUESTIONS 1-9: 1

## 2019-10-08 ENCOUNTER — OFFICE VISIT (OUTPATIENT)
Dept: PSYCHOLOGY | Age: 54
End: 2019-10-08
Payer: COMMERCIAL

## 2019-10-08 DIAGNOSIS — F41.1 GAD (GENERALIZED ANXIETY DISORDER): Primary | ICD-10-CM

## 2019-10-08 DIAGNOSIS — F34.1 PERSISTENT DEPRESSIVE DISORDER: ICD-10-CM

## 2019-10-08 PROCEDURE — 90837 PSYTX W PT 60 MINUTES: CPT | Performed by: PSYCHOLOGIST

## 2019-10-08 ASSESSMENT — ANXIETY QUESTIONNAIRES
1. FEELING NERVOUS, ANXIOUS, OR ON EDGE: 1-SEVERAL DAYS
7. FEELING AFRAID AS IF SOMETHING AWFUL MIGHT HAPPEN: 0-NOT AT ALL SURE
5. BEING SO RESTLESS THAT IT IS HARD TO SIT STILL: 0-NOT AT ALL SURE
4. TROUBLE RELAXING: 0-NOT AT ALL SURE
3. WORRYING TOO MUCH ABOUT DIFFERENT THINGS: 1-SEVERAL DAYS
GAD7 TOTAL SCORE: 4
2. NOT BEING ABLE TO STOP OR CONTROL WORRYING: 1-SEVERAL DAYS
6. BECOMING EASILY ANNOYED OR IRRITABLE: 1-SEVERAL DAYS

## 2019-10-08 ASSESSMENT — PATIENT HEALTH QUESTIONNAIRE - PHQ9
1. LITTLE INTEREST OR PLEASURE IN DOING THINGS: 1
2. FEELING DOWN, DEPRESSED OR HOPELESS: 1
SUM OF ALL RESPONSES TO PHQ QUESTIONS 1-9: 2
SUM OF ALL RESPONSES TO PHQ9 QUESTIONS 1 & 2: 2
SUM OF ALL RESPONSES TO PHQ QUESTIONS 1-9: 2

## 2019-10-29 ENCOUNTER — OFFICE VISIT (OUTPATIENT)
Dept: PSYCHOLOGY | Age: 54
End: 2019-10-29
Payer: COMMERCIAL

## 2019-10-29 DIAGNOSIS — F34.1 PERSISTENT DEPRESSIVE DISORDER: ICD-10-CM

## 2019-10-29 DIAGNOSIS — F41.1 GAD (GENERALIZED ANXIETY DISORDER): Primary | ICD-10-CM

## 2019-10-29 PROCEDURE — 90837 PSYTX W PT 60 MINUTES: CPT | Performed by: PSYCHOLOGIST

## 2019-11-07 ASSESSMENT — ANXIETY QUESTIONNAIRES
GAD7 TOTAL SCORE: 2
1. FEELING NERVOUS, ANXIOUS, OR ON EDGE: 0-NOT AT ALL
7. FEELING AFRAID AS IF SOMETHING AWFUL MIGHT HAPPEN: 0-NOT AT ALL
6. BECOMING EASILY ANNOYED OR IRRITABLE: 1-SEVERAL DAYS
3. WORRYING TOO MUCH ABOUT DIFFERENT THINGS: 1-SEVERAL DAYS
2. NOT BEING ABLE TO STOP OR CONTROL WORRYING: 0-NOT AT ALL
5. BEING SO RESTLESS THAT IT IS HARD TO SIT STILL: 0-NOT AT ALL
4. TROUBLE RELAXING: 0-NOT AT ALL

## 2019-11-07 ASSESSMENT — PATIENT HEALTH QUESTIONNAIRE - PHQ9
SUM OF ALL RESPONSES TO PHQ9 QUESTIONS 1 & 2: 0
2. FEELING DOWN, DEPRESSED OR HOPELESS: 0
SUM OF ALL RESPONSES TO PHQ QUESTIONS 1-9: 0
SUM OF ALL RESPONSES TO PHQ QUESTIONS 1-9: 0
1. LITTLE INTEREST OR PLEASURE IN DOING THINGS: 0

## 2019-11-12 ENCOUNTER — OFFICE VISIT (OUTPATIENT)
Dept: PSYCHOLOGY | Age: 54
End: 2019-11-12
Payer: COMMERCIAL

## 2019-11-12 DIAGNOSIS — F34.1 PERSISTENT DEPRESSIVE DISORDER: ICD-10-CM

## 2019-11-12 DIAGNOSIS — F41.1 GAD (GENERALIZED ANXIETY DISORDER): Primary | ICD-10-CM

## 2019-11-12 PROCEDURE — 90834 PSYTX W PT 45 MINUTES: CPT | Performed by: PSYCHOLOGIST

## 2019-11-12 ASSESSMENT — PATIENT HEALTH QUESTIONNAIRE - PHQ9
2. FEELING DOWN, DEPRESSED OR HOPELESS: 0
SUM OF ALL RESPONSES TO PHQ9 QUESTIONS 1 & 2: 0
1. LITTLE INTEREST OR PLEASURE IN DOING THINGS: 0
SUM OF ALL RESPONSES TO PHQ QUESTIONS 1-9: 0
SUM OF ALL RESPONSES TO PHQ QUESTIONS 1-9: 0

## 2020-01-07 ENCOUNTER — OFFICE VISIT (OUTPATIENT)
Dept: PSYCHOLOGY | Age: 55
End: 2020-01-07
Payer: COMMERCIAL

## 2020-01-07 PROCEDURE — 90832 PSYTX W PT 30 MINUTES: CPT | Performed by: PSYCHOLOGIST

## 2020-01-07 ASSESSMENT — PATIENT HEALTH QUESTIONNAIRE - PHQ9
SUM OF ALL RESPONSES TO PHQ QUESTIONS 1-9: 2
2. FEELING DOWN, DEPRESSED OR HOPELESS: 1
SUM OF ALL RESPONSES TO PHQ QUESTIONS 1-9: 2
1. LITTLE INTEREST OR PLEASURE IN DOING THINGS: 1
SUM OF ALL RESPONSES TO PHQ9 QUESTIONS 1 & 2: 2

## 2020-01-07 NOTE — PROGRESS NOTES
 Cholecalciferol (VITAMIN D3) 2000 UNITS CAPS Take 2,000 Units by mouth three times a week      Nutritional Supplements (JUICE PLUS FIBRE PO) Take 1.5 g by mouth daily Indications: Takes Juice Plus      Maitake Mushroom POWD 500 mg by Does not apply route daily      cetirizine (ZYRTEC) 10 MG tablet Take 10 mg by mouth daily      ALPRAZolam (XANAX) 0.5 MG tablet Take 0.5 mg by mouth as needed. No current facility-administered medications for this visit. Social History:   Social History     Socioeconomic History    Marital status: Single     Spouse name: Not on file    Number of children: 0    Years of education: Not on file    Highest education level: Not on file   Occupational History    Not on file   Social Needs    Financial resource strain: Not on file    Food insecurity:     Worry: Not on file     Inability: Not on file    Transportation needs:     Medical: Not on file     Non-medical: Not on file   Tobacco Use    Smoking status: Never Smoker    Smokeless tobacco: Never Used   Substance and Sexual Activity    Alcohol use: Yes     Comment: wine on weekends only    Drug use: No    Sexual activity: Not on file   Lifestyle    Physical activity:     Days per week: Not on file     Minutes per session: Not on file    Stress: Not on file   Relationships    Social connections:     Talks on phone: Not on file     Gets together: Not on file     Attends Judaism service: Not on file     Active member of club or organization: Not on file     Attends meetings of clubs or organizations: Not on file     Relationship status: Not on file    Intimate partner violence:     Fear of current or ex partner: Not on file     Emotionally abused: Not on file     Physically abused: Not on file     Forced sexual activity: Not on file   Other Topics Concern    Not on file   Social History Narrative    Not on file       TOBACCO:   reports that she has never smoked.  She has never used smokeless

## 2020-01-20 NOTE — PATIENT INSTRUCTIONS
1. Continue to schedule down time from work as needed for stress reduction as needed  2. Continue to take psychiatric medication as prescribed  3. Continue to follow structure and routine of work for anxiety management  4. Consider more specialized OCD treatment in January  5. Continue to stay connected socially to support from divorce care group  6.  Return to clinic for Dr Shai Tamayo in 3 weeks

## 2020-01-28 ENCOUNTER — OFFICE VISIT (OUTPATIENT)
Dept: PSYCHOLOGY | Age: 55
End: 2020-01-28
Payer: COMMERCIAL

## 2020-01-28 PROCEDURE — 90832 PSYTX W PT 30 MINUTES: CPT | Performed by: PSYCHOLOGIST

## 2020-01-28 ASSESSMENT — ANXIETY QUESTIONNAIRES
6. BECOMING EASILY ANNOYED OR IRRITABLE: 1-SEVERAL DAYS
GAD7 TOTAL SCORE: 5
3. WORRYING TOO MUCH ABOUT DIFFERENT THINGS: 1-SEVERAL DAYS
4. TROUBLE RELAXING: 1-SEVERAL DAYS
5. BEING SO RESTLESS THAT IT IS HARD TO SIT STILL: 0-NOT AT ALL
1. FEELING NERVOUS, ANXIOUS, OR ON EDGE: 1-SEVERAL DAYS
7. FEELING AFRAID AS IF SOMETHING AWFUL MIGHT HAPPEN: 0-NOT AT ALL
2. NOT BEING ABLE TO STOP OR CONTROL WORRYING: 1-SEVERAL DAYS

## 2020-01-28 ASSESSMENT — PATIENT HEALTH QUESTIONNAIRE - PHQ9
4. FEELING TIRED OR HAVING LITTLE ENERGY: 2
2. FEELING DOWN, DEPRESSED OR HOPELESS: 2
SUM OF ALL RESPONSES TO PHQ QUESTIONS 1-9: 13
10. IF YOU CHECKED OFF ANY PROBLEMS, HOW DIFFICULT HAVE THESE PROBLEMS MADE IT FOR YOU TO DO YOUR WORK, TAKE CARE OF THINGS AT HOME, OR GET ALONG WITH OTHER PEOPLE: 1
1. LITTLE INTEREST OR PLEASURE IN DOING THINGS: 2
3. TROUBLE FALLING OR STAYING ASLEEP: 2
8. MOVING OR SPEAKING SO SLOWLY THAT OTHER PEOPLE COULD HAVE NOTICED. OR THE OPPOSITE, BEING SO FIGETY OR RESTLESS THAT YOU HAVE BEEN MOVING AROUND A LOT MORE THAN USUAL: 1
6. FEELING BAD ABOUT YOURSELF - OR THAT YOU ARE A FAILURE OR HAVE LET YOURSELF OR YOUR FAMILY DOWN: 2
5. POOR APPETITE OR OVEREATING: 2
SUM OF ALL RESPONSES TO PHQ QUESTIONS 1-9: 13
SUM OF ALL RESPONSES TO PHQ9 QUESTIONS 1 & 2: 4
9. THOUGHTS THAT YOU WOULD BE BETTER OFF DEAD, OR OF HURTING YOURSELF: 0
7. TROUBLE CONCENTRATING ON THINGS, SUCH AS READING THE NEWSPAPER OR WATCHING TELEVISION: 0

## 2020-01-28 NOTE — PROGRESS NOTES
Behavioral Health Consultation Follow-up  Katty Wynn PsyD  Psychologist  1/28/2020  2:39 PM      Time spent with Patient: 30 minutes  This is patient's 12 th  [facility] appointment. Reason for Consult:  BLANCA, PDD  Referring Provider: Kwadwo Velazquez MD  [address] [facility], [address] [place] [address]    Feedback given to PCP. S:    Last appt: 1/7. More processing of excessive guilt when she doesn't do anything on the weekends, what does this mean in terms of being lazy and/or the panic about another depressive episode returning.  Last 2 weekends she stayed home and what this means, reframed as constructive rest.     O:  MSE:    Appearance    alert, cooperative  Appetite working on weight loss  Sleep disturbance better  Fatigue Yes  Loss of pleasure No  Impulsive behavior No  Speech    normal rate, normal volume and well articulated  Mood    Stable, managing  Affect    normal affect  Thought Content    intact  Thought Process    linear, goal directed and coherent  Associations    logical connections  Insight    Good  Judgment    Intact  Orientation    oriented to person, place, time, and general circumstances  Memory    recent and remote memory intact  Attention/Concentration    intact  Morbid ideation No  Suicide Assessment    no suicidal ideation    History:    Medications:   Current Outpatient Medications   Medication Sig Dispense Refill    amLODIPine (NORVASC) 5 MG tablet TAKE ONE TABLET BY MOUTH DAILY 30 tablet 5    buPROPion (WELLBUTRIN XL) 300 MG extended release tablet TAKE ONE TABLET BY MOUTH EVERY MORNING 30 tablet 11    naproxen (NAPROSYN) 500 MG tablet Take 1 tablet by mouth 2 times daily (with meals) 30 tablet 1    albuterol sulfate HFA (VENTOLIN HFA) 108 (90 Base) MCG/ACT inhaler Inhale 2 puffs into the lungs every 4 hours as needed for Wheezing 1 Inhaler 4    FLUoxetine (PROZAC) 40 MG capsule TAKE ONE CAPSULE BY MOUTH DAILY 30 capsule 10    Cholecalciferol (VITAMIN D3) 2000 UNITS CAPS Take 2,000 Units by mouth three times a week      Nutritional Supplements (JUICE PLUS FIBRE PO) Take 1.5 g by mouth daily Indications: Takes Juice Plus      Maitake Mushroom POWD 500 mg by Does not apply route daily      cetirizine (ZYRTEC) 10 MG tablet Take 10 mg by mouth daily      ALPRAZolam (XANAX) 0.5 MG tablet Take 0.5 mg by mouth as needed. No current facility-administered medications for this visit. Social History:   Social History     Socioeconomic History    Marital status: Single     Spouse name: Not on file    Number of children: 0    Years of education: Not on file    Highest education level: Not on file   Occupational History    Not on file   Social Needs    Financial resource strain: Not on file    Food insecurity:     Worry: Not on file     Inability: Not on file    Transportation needs:     Medical: Not on file     Non-medical: Not on file   Tobacco Use    Smoking status: Never Smoker    Smokeless tobacco: Never Used   Substance and Sexual Activity    Alcohol use: Yes     Comment: wine on weekends only    Drug use: No    Sexual activity: Not on file   Lifestyle    Physical activity:     Days per week: Not on file     Minutes per session: Not on file    Stress: Not on file   Relationships    Social connections:     Talks on phone: Not on file     Gets together: Not on file     Attends Mormon service: Not on file     Active member of club or organization: Not on file     Attends meetings of clubs or organizations: Not on file     Relationship status: Not on file    Intimate partner violence:     Fear of current or ex partner: Not on file     Emotionally abused: Not on file     Physically abused: Not on file     Forced sexual activity: Not on file   Other Topics Concern    Not on file   Social History Narrative    Not on file       TOBACCO:   reports that she has never smoked. She has never used smokeless tobacco.  ETOH:   reports current alcohol use.     Family History:   Family History   Problem Relation Age of Onset    Other Mother         MVP, Panic Disorder    Coronary Art Dis Father     High Blood Pressure Father     Heart Disease Father 36        MI    Diabetes Father 68        Type 2    Cancer Brother         Melanoma    High Blood Pressure Brother     Cancer Sister         Melanoma in situ    High Blood Pressure Sister     Brain Cancer Sister         Glioblastoma    Colon Cancer Paternal Grandmother      A:    See S: above    PHQ Scores 1/28/2020 1/7/2020 11/12/2019 11/7/2019 10/8/2019 9/15/2019 8/7/2019   PHQ2 Score 4 2 0 0 2 1 2   PHQ9 Score 13 2 0 0 2 1 9     Interpretation of Total Score Depression Severity: 1-4 = Minimal depression, 5-9 = Mild depression, 10-14 = Moderate depression, 15-19 = Moderately severe depression, 20-27 = Severe depression    BLANCA 7 SCORE 1/28/2020 11/7/2019 10/8/2019 8/7/2019 7/23/2019 6/21/2019 6/4/2019   BLANCA-7 Total Score 5 2 4 4 17 8 13     Interpretation of BLANCA-7 score: 5-9 = mild anxiety, 10-14 = moderate anxiety, 15+ = severe anxiety. Recommend referral to behavioral health for scores 10 or greater. No si/hi risk.      Diagnosis:    BLANCA, OCD      Diagnosis Date    Allergic rhinitis     Asthma     Breast cancer ([facility] [address] 11/2012    [facility] of blood clots 01/2013    Lungs    Miscarriage 2004    Premenstrual dysphoric syndrome     Pulmonary embolus ([facility] [address] 1/2013     Other psychosocial and environmental problems      Plan:  Pt interventions:    Practiced assertive communication, Trained in strategies for increasing balanced thinking, Discussed self-care (sleep, nutrition, rewarding activities, social support, exercise), Discussed benefits of referral for specialty care, Motivational Interviewing to increase patient confidence and compliance with adhering to behavioral change plan and Supportive techniques    Pt Behavioral Change Plan:    Patient Instructions   1.  Continue to schedule down time from work as needed for stress reduction as needed  2. Continue to take psychiatric medication as prescribed  3. Continue to follow structure and routine of work for anxiety management  4. Dr Hansa Taylor will reach out to colleagues for more specialized OCD treatment in January  5. Continue to stay connected socially to support from divorce care group  6.  Return to clinic for Dr Hansa Taylor in 3 weeks

## 2020-02-04 NOTE — PATIENT INSTRUCTIONS
1. Continue to schedule down time from work as needed for stress reduction as needed  2. Continue to take psychiatric medication as prescribed  3. Continue to follow structure and routine of work for anxiety management  4. Dr Jena Khanna will reach out to colleagues for more specialized OCD treatment in January  5. Continue to stay connected socially to support from divorce care group  6.  Return to clinic for Dr Jena Khanna in 3 weeks

## 2020-02-18 ENCOUNTER — OFFICE VISIT (OUTPATIENT)
Dept: PSYCHOLOGY | Age: 55
End: 2020-02-18
Payer: COMMERCIAL

## 2020-02-18 PROCEDURE — 90832 PSYTX W PT 30 MINUTES: CPT | Performed by: PSYCHOLOGIST

## 2020-02-18 ASSESSMENT — PATIENT HEALTH QUESTIONNAIRE - PHQ9
2. FEELING DOWN, DEPRESSED OR HOPELESS: 1
SUM OF ALL RESPONSES TO PHQ QUESTIONS 1-9: 2
SUM OF ALL RESPONSES TO PHQ9 QUESTIONS 1 & 2: 2
1. LITTLE INTEREST OR PLEASURE IN DOING THINGS: 1
SUM OF ALL RESPONSES TO PHQ QUESTIONS 1-9: 2
2. FEELING DOWN, DEPRESSED OR HOPELESS: 1
SUM OF ALL RESPONSES TO PHQ QUESTIONS 1-9: 2
1. LITTLE INTEREST OR PLEASURE IN DOING THINGS: 1
SUM OF ALL RESPONSES TO PHQ9 QUESTIONS 1 & 2: 2
SUM OF ALL RESPONSES TO PHQ QUESTIONS 1-9: 2

## 2020-02-18 ASSESSMENT — ANXIETY QUESTIONNAIRES
3. WORRYING TOO MUCH ABOUT DIFFERENT THINGS: 1-SEVERAL DAYS
7. FEELING AFRAID AS IF SOMETHING AWFUL MIGHT HAPPEN: 0-NOT AT ALL
1. FEELING NERVOUS, ANXIOUS, OR ON EDGE: 1-SEVERAL DAYS
6. BECOMING EASILY ANNOYED OR IRRITABLE: 1-SEVERAL DAYS
4. TROUBLE RELAXING: 0-NOT AT ALL
5. BEING SO RESTLESS THAT IT IS HARD TO SIT STILL: 0-NOT AT ALL
GAD7 TOTAL SCORE: 3
2. NOT BEING ABLE TO STOP OR CONTROL WORRYING: 0-NOT AT ALL

## 2020-02-18 NOTE — PROGRESS NOTES
Behavioral Health Consultation Follow-up  Victor M Del Angel PsyD  Psychologist  2/18/2020  2:44 PM      Time spent with Patient: 30 minutes  This is patient's first  [facility] appointment. Reason for Consult:  BLANCA, PDD, OCD  Referring Provider: Mj Pollard MD  [address] [facility], [address] [facility]    Feedback given to PCP. S:    Last appt:1/28. Went away for weekend 2nd year anniversary of her separation from . It was good for her, went to [facility]. Expressing confidence in her doing a better job in slowing down and taking care of herself. This sparked deeper discussion about readiness for pursing more specialized treatment for OCD picking. Discussed how this would support self-acceptance and get her ready for dating again. I provided her a referral to KALYN Zambrano in [facility]. She is nervous and will think about calling her.      O:  MSE:    Appearance    alert, cooperative  Appetite normal  Sleep disturbance No  Fatigue No  Loss of pleasure No  Impulsive behavior No  Speech    normal rate, normal volume and well articulated  Mood    Stress levels down  Affect    normal affect  Thought Content    intact  Thought Process    linear, goal directed and coherent  Associations    logical connections  Insight    Good  Judgment    Intact  Orientation    oriented to person, place, time, and general circumstances  Memory    recent and remote memory intact  Attention/Concentration    intact  Morbid ideation No  Suicide Assessment    no suicidal ideation    History:    Medications:   Current Outpatient Medications   Medication Sig Dispense Refill    amLODIPine (NORVASC) 5 MG tablet TAKE ONE TABLET BY MOUTH DAILY 30 tablet 5    buPROPion (WELLBUTRIN XL) 300 MG extended release tablet TAKE ONE TABLET BY MOUTH EVERY MORNING 30 tablet 11    naproxen (NAPROSYN) 500 MG tablet Take 1 tablet by mouth 2 times daily (with meals) 30 tablet 1    albuterol sulfate HFA (VENTOLIN HFA) 108 (90 Base) MCG/ACT Other Topics Concern    Not on file   Social History Narrative    Not on file       TOBACCO:   reports that she has never smoked. She has never used smokeless tobacco.  ETOH:   reports current alcohol use. Family History:   Family History   Problem Relation Age of Onset    Other Mother         MVP, Panic Disorder    Coronary Art Dis Father     High Blood Pressure Father     Heart Disease Father 36        MI    Diabetes Father 68        Type 2    Cancer Brother         Melanoma    High Blood Pressure Brother     Cancer Sister         Melanoma in situ    High Blood Pressure Sister     Brain Cancer Sister         Glioblastoma    Colon Cancer Paternal Grandmother        A:    See S: above    PHQ Scores 2/18/2020 2/18/2020 1/28/2020 1/7/2020 11/12/2019 11/7/2019 10/8/2019   PHQ2 Score 2 2 4 2 0 0 2   PHQ9 Score 2 2 13 2 0 0 2     Interpretation of Total Score Depression Severity: 1-4 = Minimal depression, 5-9 = Mild depression, 10-14 = Moderate depression, 15-19 = Moderately severe depression, 20-27 = Severe depression    BLANCA 7 SCORE 2/18/2020 1/28/2020 11/7/2019 10/8/2019 8/7/2019 7/23/2019 6/21/2019   BLANCA-7 Total Score 3 5 2 4 4 17 8     Interpretation of BLANCA-7 score: 5-9 = mild anxiety, 10-14 = moderate anxiety, 15+ = severe anxiety. Recommend referral to behavioral health for scores 10 or greater. No si/hi risk.      Diagnosis:    BLANCA, PDD, OCD      Diagnosis Date    Allergic rhinitis     Asthma     Breast cancer ([facility] [address] 11/2012    [facility] [place]2013    Lungs    Miscarriage 2004    Premenstrual dysphoric syndrome     Pulmonary embolus ([facility] [address] 1/2013     Problems with primary support group, Problems related to the social environment and Other psychosocial and environmental problems    Plan:  Pt interventions:    Practiced assertive communication, Trained in strategies for increasing balanced thinking, Discussed self-care (sleep, nutrition, rewarding activities, social support, exercise), Discussed benefits of referral for specialty care, Motivational Interviewing to increase patient confidence and compliance with adhering to behavioral change plan and Supportive techniques    Pt Behavioral Change Plan:    1. Continue to schedule down time from work as needed for stress reduction as needed  2. Continue to take psychiatric medication as prescribed  3. Consider calling Sherryle Goody, LISW-S for more specialized OCD treatment   4. Continue to stay connected socially to support from divorce care group  5.  Return to clinic for Dr Saúl Mcfarland in 3 weeks

## 2020-02-26 NOTE — PATIENT INSTRUCTIONS
1. Continue to schedule down time from work as needed for stress reduction as needed  2. Continue to take psychiatric medication as prescribed  3. Consider calling TAMMIE Nguyen for more specialized OCD treatment   4. Continue to stay connected socially to support from divorce care group  5.  Return to clinic for Dr Caesar Montes in 3 weeks

## 2020-03-20 ENCOUNTER — OFFICE VISIT (OUTPATIENT)
Dept: PSYCHOLOGY | Age: 55
End: 2020-03-20
Payer: COMMERCIAL

## 2020-03-20 PROCEDURE — 90832 PSYTX W PT 30 MINUTES: CPT | Performed by: PSYCHOLOGIST

## 2020-03-20 NOTE — PROGRESS NOTES
tablet by mouth 2 times daily (with meals) 30 tablet 1    albuterol sulfate HFA (VENTOLIN HFA) 108 (90 Base) MCG/ACT inhaler Inhale 2 puffs into the lungs every 4 hours as needed for Wheezing 1 Inhaler 4    FLUoxetine (PROZAC) 40 MG capsule TAKE ONE CAPSULE BY MOUTH DAILY 30 capsule 10    Cholecalciferol (VITAMIN D3) 2000 UNITS CAPS Take 2,000 Units by mouth three times a week      Nutritional Supplements (JUICE PLUS FIBRE PO) Take 1.5 g by mouth daily Indications: Takes Juice Plus      Maitake Mushroom POWD 500 mg by Does not apply route daily      cetirizine (ZYRTEC) 10 MG tablet Take 10 mg by mouth daily       No current facility-administered medications for this visit.         Social History:   Social History     Socioeconomic History    Marital status: Single     Spouse name: Not on file    Number of children: 0    Years of education: Not on file    Highest education level: Not on file   Occupational History    Not on file   Social Needs    Financial resource strain: Not on file    Food insecurity     Worry: Not on file     Inability: Not on file   [employer] needs     Medical: Not on file     Non-medical: Not on file   Tobacco Use    Smoking status: Never Smoker    Smokeless tobacco: Never Used   Substance and Sexual Activity    Alcohol use: Yes     Comment: wine on weekends only    Drug use: No    Sexual activity: Not on file   Lifestyle    Physical activity     Days per week: Not on file     Minutes per session: Not on file    Stress: Not on file   Relationships    Social connections     Talks on phone: Not on file     Gets together: Not on file     Attends Oriental orthodox service: Not on file     Active member of club or organization: Not on file     Attends meetings of clubs or organizations: Not on file     Relationship status: Not on file    Intimate partner violence     Fear of current or ex partner: Not on file     Emotionally abused: Not on file     Physically abused: Not on file

## 2020-04-03 ENCOUNTER — TELEMEDICINE (OUTPATIENT)
Dept: PSYCHOLOGY | Age: 55
End: 2020-04-03
Payer: COMMERCIAL

## 2020-04-03 PROCEDURE — 90832 PSYTX W PT 30 MINUTES: CPT | Performed by: PSYCHOLOGIST

## 2020-04-08 ENCOUNTER — TELEPHONE (OUTPATIENT)
Dept: PSYCHOLOGY | Age: 55
End: 2020-04-08

## 2020-04-08 NOTE — PROGRESS NOTES
Behavioral Health Consultation Follow-up  Berlin Martines PsyD  Psychologist4/3/2020  10:41 AM      NOTE - this visit conducted via audiovisual means : MyChart, Doxy, etc, in accordance with CMS guidelines. Visit initiated at patient or caregiver request with permission to bill to insurer granted. Telemedicine APA guidelines were reviewed and discussed. Patient gave verbal consent for teleservices and will sign a consent form when feasible. Location of provider: [address] [place] [address] Internal medicine  Location of patient: home    Time spent with Patient: 30 minutes  This is patient's third  [facility] appointment. Reason for Consult:  BLANCA, PDD, OCD  Referring Provider: Nga Rojas MD  168 [facility], [address] [place] [address]    Feedback given to PCP. S:    Last appt: 3/20. More focus on grief reaction as her sister's health continues to deteriorate. CBT interventions in regards to excessive guilt getting in the way of her being clear that she is doing everything she can to support her sister, due to limited contact because of COVID. 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 We identified some ways she can continue self-care during this very challenging time due to COVID-19.      O:  MSE:    Appearance    alert, cooperative, crying  Appetite normal  Sleep disturbance Yes  Fatigue Yes  Loss of pleasure Yes  Impulsive behavior No  Speech    normal rate, normal volume and well articulated  Mood    Stable, anxiety controlled managing   Affect   Sad about state of sister's health  Thought Content    intact  Thought Process    linear, goal directed and coherent  Associations    logical connections  Insight    Good  Judgment    Intact  Orientation    oriented to person, place, time, and general circumstances  Memory    recent and remote memory intact  Attention/Concentration    intact  Morbid ideation No  Suicide Assessment    no suicidal Attends Yarsani service: Not on file     Active member of club or organization: Not on file     Attends meetings of clubs or organizations: Not on file     Relationship status: Not on file    Intimate partner violence     Fear of current or ex partner: Not on file     Emotionally abused: Not on file     Physically abused: Not on file     Forced sexual activity: Not on file   Other Topics Concern    Not on file   Social History Narrative    Not on file       TOBACCO:   reports that she has never smoked. She has never used smokeless tobacco.  ETOH:   reports current alcohol use. Family History:   Family History   Problem Relation Age of Onset    Other Mother         MVP, Panic Disorder    Coronary Art Dis Father     High Blood Pressure Father     Heart Disease Father 36        MI    Diabetes Father 68        Type 2    Cancer Brother         Melanoma    High Blood Pressure Brother     Cancer Sister         Melanoma in situ    High Blood Pressure Sister     Brain Cancer Sister         Glioblastoma    Colon Cancer Paternal Grandmother      A:    See S: above    Safety: No si/hi risk. Diagnosis:    BLANCA, Persistent depressive disorder, OCD      Diagnosis Date    Allergic rhinitis     Asthma     Breast cancer ([facility] [address] 11/2012    [facility] of blood clots 01/2013    Lungs    Miscarriage 2004    Premenstrual dysphoric syndrome     Pulmonary embolus ([facility] [address] 1/2013     Problems with primary support group and Problems related to the social environment    Plan:  Pt interventions:    Practiced assertive communication, Trained in strategies for increasing balanced thinking, Discussed self-care (sleep, nutrition, rewarding activities, social support, exercise), Supportive techniques and Provided Psychoeducation re: normal grief reaction. Pt Behavioral Change Plan:    1. Continue to schedule down time from work as needed for stress reduction as needed  2.  Continue to spend time with sister as much as

## 2020-04-10 NOTE — PATIENT INSTRUCTIONS
1. Continue to schedule down time from work as needed for stress reduction as needed  2. Continue to spend time with sister as much as possible virtually   3. Continue to take psychiatric medication as prescribed  4. Consider calling TAMMIE Lara for more specialized OCD treatment once COVID restricted lifted  5.  Return to clinic for Dr Tino Miranda next week

## 2020-04-14 ENCOUNTER — TELEMEDICINE (OUTPATIENT)
Dept: PSYCHOLOGY | Age: 55
End: 2020-04-14
Payer: COMMERCIAL

## 2020-04-14 PROCEDURE — 90832 PSYTX W PT 30 MINUTES: CPT | Performed by: PSYCHOLOGIST

## 2020-04-14 NOTE — PROGRESS NOTES
file     Active member of club or organization: Not on file     Attends meetings of clubs or organizations: Not on file     Relationship status: Not on file    Intimate partner violence     Fear of current or ex partner: Not on file     Emotionally abused: Not on file     Physically abused: Not on file     Forced sexual activity: Not on file   Other Topics Concern    Not on file   Social History Narrative    Not on file       TOBACCO:   reports that she has never smoked. She has never used smokeless tobacco.  ETOH:   reports current alcohol use. Family History:   Family History   Problem Relation Age of Onset    Other Mother         MVP, Panic Disorder    Coronary Art Dis Father     High Blood Pressure Father     Heart Disease Father 36        MI    Diabetes Father 68        Type 2    Cancer Brother         Melanoma    High Blood Pressure Brother     Cancer Sister         Melanoma in situ    High Blood Pressure Sister     Brain Cancer Sister         Glioblastoma    Colon Cancer Paternal Grandmother      A:  See S: above    Diagnosis:    BLANCA, OCD, PDD      Diagnosis Date    Allergic rhinitis     Asthma     Breast cancer ([facility] [address] 11/2012    [facility] of blood clots 01/2013    Lungs    Miscarriage 2004    Premenstrual dysphoric syndrome     Pulmonary embolus ([facility] [address] 1/2013     Problems with primary support group, Problems related to the social environment and Other psychosocial and environmental problems    Plan:  Pt interventions:    Practiced assertive communication, Trained in strategies for increasing balanced thinking, Discussed self-care (sleep, nutrition, rewarding activities, social support, exercise), Discussed and problem-solved barriers in adhering to behavioral change plan, Supportive techniques, Provided Psychoeducation re: normal grief reaction: symptoms and self-care.   and Explained relaxed breathing technique in detail and practiced this with pt in visit    Pt Behavioral Change

## 2020-04-24 ENCOUNTER — TELEMEDICINE (OUTPATIENT)
Dept: PSYCHOLOGY | Age: 55
End: 2020-04-24
Payer: COMMERCIAL

## 2020-04-24 PROCEDURE — 90832 PSYTX W PT 30 MINUTES: CPT | Performed by: PSYCHOLOGIST

## 2020-05-12 ENCOUNTER — VIRTUAL VISIT (OUTPATIENT)
Dept: PSYCHOLOGY | Age: 55
End: 2020-05-12
Payer: COMMERCIAL

## 2020-05-12 PROCEDURE — 90832 PSYTX W PT 30 MINUTES: CPT | Performed by: PSYCHOLOGIST

## 2020-05-12 NOTE — PROGRESS NOTES
organization: Not on file     Attends meetings of clubs or organizations: Not on file     Relationship status: Not on file    Intimate partner violence     Fear of current or ex partner: Not on file     Emotionally abused: Not on file     Physically abused: Not on file     Forced sexual activity: Not on file   Other Topics Concern    Not on file   Social History Narrative    Not on file       TOBACCO:   reports that she has never smoked. She has never used smokeless tobacco.  ETOH:   reports current alcohol use. Family History:   Family History   Problem Relation Age of Onset    Other Mother         MVP, Panic Disorder    Coronary Art Dis Father     High Blood Pressure Father     Heart Disease Father 36        MI    Diabetes Father 68        Type 2    Cancer Brother         Melanoma    High Blood Pressure Brother     Cancer Sister         Melanoma in situ    High Blood Pressure Sister     Brain Cancer Sister         Glioblastoma    Colon Cancer Paternal Grandmother      A:    See S: above    Diagnosis:    BLANCA, OCD, Persistent depressive disorder       Diagnosis Date    Allergic rhinitis     Asthma     Breast cancer ([facility] [address] 11/2012    [facility] of blood clots 01/2013    Lungs    Miscarriage 2004    Premenstrual dysphoric syndrome     Pulmonary embolus ([facility] [address] 1/2013     Problems with primary support group, Problems related to the social environment and Other psychosocial and environmental problems    Plan:  Pt interventions:    Practiced assertive communication, Trained in strategies for increasing balanced thinking, Discussed self-care (sleep, nutrition, rewarding activities, social support, exercise), Discussed and problem-solved barriers in adhering to behavioral change plan and Supportive techniques. More CBT interventions to manage excessive guilt that gets in the way of self-care. Pt Behavioral Change Plan:    1. Continue to schedule down time for yourself   2.  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 Stay connected to your family any amount within COVID restrictions    4. Continue to work on walking daily, 30 minutes or 150 minutes per week physical movement to decrease depression/anxiety  5.  Return to clinic for Dr Pasha Almaraz in 2 weeks

## 2020-05-26 ENCOUNTER — VIRTUAL VISIT (OUTPATIENT)
Dept: PSYCHOLOGY | Age: 55
End: 2020-05-26
Payer: COMMERCIAL

## 2020-05-26 PROCEDURE — 90834 PSYTX W PT 45 MINUTES: CPT | Performed by: PSYCHOLOGIST

## 2020-05-26 NOTE — PATIENT INSTRUCTIONS
1. Continue to schedule down time for yourself   2. Stay connected to your family any amount within COVID restrictions    3. Continue to work on walking daily, 30 minutes or 150 minutes per week physical movement to decrease depression/anxiety  4. Continue to take psychiatric medication as prescribed, go to Dr Noelle Solomon as needed  5.  Return to clinic for Dr Adam Moscoso in 2 weeks, understands most likely will be VV

## 2020-06-19 ENCOUNTER — OFFICE VISIT (OUTPATIENT)
Dept: PSYCHOLOGY | Age: 55
End: 2020-06-19
Payer: COMMERCIAL

## 2020-06-19 PROCEDURE — 90832 PSYTX W PT 30 MINUTES: CPT | Performed by: PSYCHOLOGIST

## 2020-06-19 NOTE — PROGRESS NOTES
Behavioral Health Consultation Follow-up  Liu Castro PsyD  Psychologist  6/19/2020  8:37 AM        Time spent with Patient: 35 minutes  This is patient's eighth  [facility] appointment. Reason for Consult:  BLANCA, PDD, OCD  Referring Provider: Tianna Alarcon MD  168 [facility], [address] [facility]    Feedback given to PCP. S:    Last appt: 5/26. Pt continues to move along in her grief recovery since the death of her sister in April. [facility] service scheduled for 7/11. Good news she is going to take a girls weekend with 3 other friends at the end of the month, 6/30. Surgery for breast implant replacements, going to reduce size on 7/16. We discussed the importance of slowing down her pace over the next month and identified some ways she can do this and more CBT to manage excessive guilt tied to care of self and care of others including mother. Social support: still connected to grief support group at her [facility]. Starting to think about dating.      O:  MSE:    Appearance    alert, cooperative, crying  Appetite normal  Sleep disturbance No  Fatigue Yes  Loss of pleasure Yes  Impulsive behavior No  Speech    normal rate, normal volume and well articulated  Mood    Stable, managing   Affect    normal affect  Thought Content    intact  Thought Process    linear, goal directed and coherent  Associations    logical connections  Insight    Good  Judgment    Intact  Orientation    oriented to person, place, time, and general circumstances  Memory    recent and remote memory intact  Attention/Concentration    intact  Morbid ideation No  Suicide Assessment    no suicidal ideation      History:    Medications:   Current Outpatient Medications   Medication Sig Dispense Refill    amLODIPine (NORVASC) 5 MG tablet TAKE ONE TABLET BY MOUTH DAILY 30 tablet 5    buPROPion (WELLBUTRIN XL) 300 MG extended release tablet TAKE ONE TABLET BY MOUTH EVERY MORNING 30 tablet 11    naproxen (NAPROSYN) 500 MG abused: Not on file     Forced sexual activity: Not on file   Other Topics Concern    Not on file   Social History Narrative    Not on file       TOBACCO:   reports that she has never smoked. She has never used smokeless tobacco.  ETOH:   reports current alcohol use. Family History:   Family History   Problem Relation Age of Onset    Other Mother         MVP, Panic Disorder    Coronary Art Dis Father     High Blood Pressure Father     Heart Disease Father 36        MI    Diabetes Father 68        Type 2    Cancer Brother         Melanoma    High Blood Pressure Brother     Cancer Sister         Melanoma in situ    High Blood Pressure Sister     Brain Cancer Sister         Glioblastoma    Colon Cancer Paternal Grandmother        A:    See S: above    Diagnosis:    BLANCA, PDD, OCD, excoriation       Diagnosis Date    Allergic rhinitis     Asthma     Breast cancer ([facility] [address] 11/2012    [facility] of blood clots 01/2013    Lungs    Miscarriage 2004    Premenstrual dysphoric syndrome     Pulmonary embolus ([facility] [address] 1/2013     Problems with primary support group and Problems related to the social environment    Safety: no si/hi risk    Plan:  Pt interventions:    Practiced assertive communication, Trained in strategies for increasing balanced thinking, Discussed self-care (sleep, nutrition, rewarding activities, social support, exercise), Discussed and problem-solved barriers in adhering to behavioral change plan, Supportive techniques and CBT to target excessive guilt that gets in way of self-care. Pt Behavioral Change Plan:    1. Continue to schedule down time for yourself   2. Stay connected to your family any amount within COVID restrictions    3. Continue to work on walking daily, 30 minutes or 150 minutes per week physical movement to decrease depression/anxiety  4. Continue to take psychiatric medication as prescribed, go to Dr Jose Myrick as needed  5.  Enjoy travel with girlfriends on 6/30 for stress reduction  6.  Return to clinic for Dr Ingris Burgos in 1 month, may need virtual if still recovering from breast surgery

## 2020-07-14 ENCOUNTER — VIRTUAL VISIT (OUTPATIENT)
Dept: PSYCHOLOGY | Age: 55
End: 2020-07-14
Payer: COMMERCIAL

## 2020-07-14 PROCEDURE — 90832 PSYTX W PT 30 MINUTES: CPT | Performed by: PSYCHOLOGIST

## 2020-07-14 NOTE — PROGRESS NOTES
Behavioral Health Consultation Follow-up  Sam Ma PsyD  Psychologist  7/14/2020  1:43 PM    NOTE - this visit conducted via audiovisual means : MyChart, Doxy, etc, in accordance with CMS guidelines. During XXSWH-20 public health emergency. Visit initiated at patient or caregiver request with permission to bill to insurer granted. Telemedicine APA guidelines were reviewed and discussed. Patient gave verbal consent for teleservices and will sign a consent form when feasible. Location of provider: [facility]  Location of patient: home    Time spent with Patient: 30 minutes  This is patient's ninth  [facility] appointment. Reason for Consult:  BLANCA, PDD, OCD  Referring Provider: Junito Snow MD  168 [facility], [address] [place] [address]    Feedback given to PCP. S:    Last appt: 6/19. Breast reduction surgery on 7/16. Feeling some anxiety but managing. 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 Some discussion about how asthma and cancer survivor status may put her at increased risk if her school opens up. Rest of appt focused on how she is staying connected socially and doing her best to optimize lifestyle in the midst of COVID. Doing a great job not letting excessive guilt get in the way of self-care the last week.      O:  MSE:    Appearance    alert, cooperative  Appetite normal  Sleep disturbance No  Fatigue Yes  Loss of pleasure Yes  Impulsive behavior No  Speech    normal rate, normal volume and well articulated  Mood    Stable, managing   Affect    normal affect  Thought Content    intact  Thought Process    linear, goal directed and coherent  Associations    logical connections  Insight    Good  Judgment    Intact  Orientation    oriented to person, place, time, and general circumstances  Memory    recent and remote memory intact  Attention/Concentration    intact  Morbid ideation No  Suicide Assessment    no suicidal ideation    History:    Medications:   Current Outpatient Medications   Medication Sig Dispense Refill    albuterol sulfate HFA (VENTOLIN HFA) 108 (90 Base) MCG/ACT inhaler Inhale 2 puffs into the lungs every 4 hours as needed for Wheezing 1 Inhaler 4    amLODIPine (NORVASC) 5 MG tablet TAKE ONE TABLET BY MOUTH DAILY 30 tablet 5    buPROPion (WELLBUTRIN XL) 300 MG extended release tablet TAKE ONE TABLET BY MOUTH EVERY MORNING 30 tablet 11    FLUoxetine (PROZAC) 40 MG capsule TAKE ONE CAPSULE BY MOUTH DAILY 30 capsule 10    Cholecalciferol (VITAMIN D3) 2000 UNITS CAPS Take 2,000 Units by mouth three times a week      Nutritional Supplements (JUICE PLUS FIBRE PO) Take 1.5 g by mouth daily Indications: Takes Juice Plus      cetirizine (ZYRTEC) 10 MG tablet Take 10 mg by mouth daily       No current facility-administered medications for this visit.         Social History:   Social History     Socioeconomic History    Marital status: Single     Spouse name: Not on file    Number of children: 0    Years of education: Not on file    Highest education level: Not on file   Occupational History    Not on file   Social Needs    Financial resource strain: Patient refused    Food insecurity     Worry: Patient refused     Inability: Patient refused    Transportation needs     Medical: Patient refused     Non-medical: Patient refused   Tobacco Use    Smoking status: Never Smoker    Smokeless tobacco: Never Used   Substance and Sexual Activity    Alcohol use: Yes     Comment: wine on weekends only    Drug use: No    Sexual activity: Not on file   Lifestyle    Physical activity     Days per week: Not on file     Minutes per session: Not on file    Stress: Not on file   Relationships    Social connections     Talks on phone: Not on file     Gets together: Not on file     Attends Sabianism service: Not on file Active member of club or organization: Not on file     Attends meetings of clubs or organizations: Not on file     Relationship status: Not on file    Intimate partner violence     Fear of current or ex partner: Not on file     Emotionally abused: Not on file     Physically abused: Not on file     Forced sexual activity: Not on file   Other Topics Concern    Not on file   Social History Narrative    Not on file       TOBACCO:   reports that she has never smoked. She has never used smokeless tobacco.  ETOH:   reports current alcohol use. Family History:   Family History   Problem Relation Age of Onset    Other Mother         MVP, Panic Disorder    Coronary Art Dis Father     High Blood Pressure Father     Heart Disease Father 36        MI    Diabetes Father 68        Type 2    Cancer Brother         Melanoma    High Blood Pressure Brother     Cancer Sister         Melanoma in situ    High Blood Pressure Sister     Brain Cancer Sister         Glioblastoma    Colon Cancer Paternal Grandmother      A:    See S: above    Diagnosis:    BLANCA, PDD, habitual self-excoriation       Diagnosis Date    Allergic rhinitis     Asthma     Breast cancer ([facility] [address] 11/2012    [facility] of blood clots 01/2013    Lungs    Miscarriage 2004    Premenstrual dysphoric syndrome     Pulmonary embolus ([facility] [address] 1/2013     Problems with primary support group, Problems related to the social environment, Occupational problems and Other psychosocial and environmental problems    Safety: no si/hi risk. Plan:  Pt interventions:    Practiced assertive communication, Trained in strategies for increasing balanced thinking, Discussed self-care (sleep, nutrition, rewarding activities, social support, exercise), Discussed and problem-solved barriers in adhering to behavioral change plan and Supportive techniques    Pt Behavioral Change Plan:    1. Continue to schedule down time for yourself   2.  Stay connected to your family any amount within COVID restrictions    3. Continue to work on walking daily, 30 minutes or 150 minutes per week physical movement to decrease depression/anxiety  4. Continue to take psychiatric medication as prescribed, go to Dr Bao Crook as needed  5. Reach out to others post surgery for emotional support   6.  Return to clinic for Dr Ban Asher in 2 weeks

## 2020-07-14 NOTE — PATIENT INSTRUCTIONS
1. Continue to schedule down time for yourself   2. Stay connected to your family any amount within COVID restrictions    3. Continue to work on walking daily, 30 minutes or 150 minutes per week physical movement to decrease depression/anxiety  4. Continue to take psychiatric medication as prescribed, go to Dr Hemalatha Teixeira as needed  5. Reach out to others post surgery for emotional support   6.  Return to clinic for Dr Gavino Goodrich in 2 weeks

## 2020-07-24 ENCOUNTER — OFFICE VISIT (OUTPATIENT)
Dept: PSYCHOLOGY | Age: 55
End: 2020-07-24
Payer: COMMERCIAL

## 2020-07-24 PROCEDURE — 90832 PSYTX W PT 30 MINUTES: CPT | Performed by: PSYCHOLOGIST

## 2020-07-24 ASSESSMENT — PATIENT HEALTH QUESTIONNAIRE - PHQ9
SUM OF ALL RESPONSES TO PHQ9 QUESTIONS 1 & 2: 2
1. LITTLE INTEREST OR PLEASURE IN DOING THINGS: 0
2. FEELING DOWN, DEPRESSED OR HOPELESS: 2
SUM OF ALL RESPONSES TO PHQ QUESTIONS 1-9: 2
SUM OF ALL RESPONSES TO PHQ QUESTIONS 1-9: 2

## 2020-07-24 ASSESSMENT — ANXIETY QUESTIONNAIRES
GAD7 TOTAL SCORE: 9
3. WORRYING TOO MUCH ABOUT DIFFERENT THINGS: 2-OVER HALF THE DAYS
1. FEELING NERVOUS, ANXIOUS, OR ON EDGE: 1-SEVERAL DAYS
4. TROUBLE RELAXING: 1-SEVERAL DAYS
7. FEELING AFRAID AS IF SOMETHING AWFUL MIGHT HAPPEN: 2-OVER HALF THE DAYS
2. NOT BEING ABLE TO STOP OR CONTROL WORRYING: 2-OVER HALF THE DAYS
6. BECOMING EASILY ANNOYED OR IRRITABLE: 1-SEVERAL DAYS
5. BEING SO RESTLESS THAT IT IS HARD TO SIT STILL: 0-NOT AT ALL

## 2020-07-24 NOTE — Clinical Note
FYI--Cassie may reach out to you if she hasn't already to get your thoughts on asking for accommodations for work due to her chronic anxiety disorders and any other physical health concerns you may have. 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 F/u with me tomorrow.    Crystal

## 2020-07-24 NOTE — PROGRESS NOTES
Behavioral Health Consultation Follow-up  Ruy Quezada PsyD  Psychologist  7/24/2020  10:12 AM      Time spent with Patient: 30 minutes  This is patient's tenth  [facility] appointment. Reason for Consult:  BLANCA, PDD, OCD  Referring Provider: Jenny Hannah MD  [address] [facility], [address] [facility]    Feedback given to PCP. S:    Last appt: 7/14. Moving along in regards to her breast reduction surgery, very happy with the results. 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 No clear decision has made, will have school community meeting over the next week. May return to school at end of August. This sparked deeper discussion about the impact of this additional uncertainty is on her chronic anxiety disorders. Explored the benefits of asking employer for accommodations due to this. 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 Also emphasized importance of consult with PCP to get his opinion on this so that she has support she may need.      O:  MSE:    Appearance    alert, cooperative, crying  Appetite abnormal: poor  Sleep disturbance off and on  Fatigue Yes  Loss of pleasure Yes  Impulsive behavior No  Speech    normal rate, normal volume and well articulated  Mood    Anxious  Fearful to return to work in school setting  [address] with full range  Thought Content    intact  Thought Process    linear, goal directed and coherent  Associations    logical connections  Insight    Good  Judgment    Intact  Orientation    oriented to person, place, time, and general circumstances  Memory    recent and remote memory intact  Attention/Concentration    intact  Morbid ideation No  Suicide Assessment    no suicidal ideation    History:    Medications:   Current Outpatient Medications   Medication Sig Dispense Refill    albuterol sulfate HFA (VENTOLIN HFA) 108 (90 Base) MCG/ACT inhaler Inhale 2 puffs into the lungs every 4 hours as needed for Wheezing 1 Inhaler 4    amLODIPine (NORVASC) 5 MG tablet TAKE ONE TABLET BY MOUTH DAILY 30 tablet 5    buPROPion (WELLBUTRIN XL) 300 MG extended release tablet TAKE ONE TABLET BY MOUTH EVERY MORNING 30 tablet 11    FLUoxetine (PROZAC) 40 MG capsule TAKE ONE CAPSULE BY MOUTH DAILY 30 capsule 10    Cholecalciferol (VITAMIN D3) 2000 UNITS CAPS Take 2,000 Units by mouth three times a week      Nutritional Supplements (JUICE PLUS FIBRE PO) Take 1.5 g by mouth daily Indications: Takes Juice Plus      cetirizine (ZYRTEC) 10 MG tablet Take 10 mg by mouth daily       No current facility-administered medications for this visit.         Social History:   Social History     Socioeconomic History    Marital status: Single     Spouse name: Not on file    Number of children: 0    Years of education: Not on file    Highest education level: Not on file   Occupational History    Not on file   Social Needs    Financial resource strain: Patient refused    Food insecurity     Worry: Patient refused     Inability: Patient refused    Transportation needs     Medical: Patient refused     Non-medical: Patient refused   Tobacco Use    Smoking status: Never Smoker    Smokeless tobacco: Never Used   Substance and Sexual Activity    Alcohol use: Yes     Comment: wine on weekends only    Drug use: No    Sexual activity: Not on file   Lifestyle    Physical activity     Days per week: Not on file     Minutes per session: Not on file    Stress: Not on file   Relationships    Social connections     Talks on phone: Not on file     Gets together: Not on file     Attends Yazidi service: Not on file     Active member of club or organization: Not on file     Attends meetings of clubs or organizations: Not on file     Relationship status: Not on file    Intimate partner violence     Fear of current or ex partner: Not on file     Emotionally abused: Not on file increasing balanced thinking, Discussed self-care (sleep, nutrition, rewarding activities, social support, exercise), Discussed benefits of referral for specialty care, Discussed and problem-solved barriers in adhering to behavioral change plan and Supportive techniques    Pt Behavioral Change Plan:    1. Continue to schedule down time for yourself   2. Stay connected to your family any amount within COVID restrictions    3. Continue to work on walking daily, 30 minutes or 150 minutes per week physical movement to decrease depression/anxiety  4. Continue to take psychiatric medication as prescribed, go to Dr Frederick Donnelly as needed  5. Consult with Dr Frederick Donnelly about asking for accommodations for work possibly virtual only due to chronic anxiety disorder and other health issues? 6. 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  Return to clinic for Dr Jeremías Zamora next week

## 2020-07-31 ENCOUNTER — OFFICE VISIT (OUTPATIENT)
Dept: PSYCHOLOGY | Age: 55
End: 2020-07-31
Payer: COMMERCIAL

## 2020-07-31 PROCEDURE — 90832 PSYTX W PT 30 MINUTES: CPT | Performed by: PSYCHOLOGIST

## 2020-07-31 ASSESSMENT — ANXIETY QUESTIONNAIRES
7. FEELING AFRAID AS IF SOMETHING AWFUL MIGHT HAPPEN: 2-OVER HALF THE DAYS
4. TROUBLE RELAXING: 0-NOT AT ALL
1. FEELING NERVOUS, ANXIOUS, OR ON EDGE: 1-SEVERAL DAYS
GAD7 TOTAL SCORE: 6
2. NOT BEING ABLE TO STOP OR CONTROL WORRYING: 1-SEVERAL DAYS
5. BEING SO RESTLESS THAT IT IS HARD TO SIT STILL: 0-NOT AT ALL
6. BECOMING EASILY ANNOYED OR IRRITABLE: 1-SEVERAL DAYS
3. WORRYING TOO MUCH ABOUT DIFFERENT THINGS: 1-SEVERAL DAYS

## 2020-07-31 ASSESSMENT — PATIENT HEALTH QUESTIONNAIRE - PHQ9
2. FEELING DOWN, DEPRESSED OR HOPELESS: 1
1. LITTLE INTEREST OR PLEASURE IN DOING THINGS: 0
SUM OF ALL RESPONSES TO PHQ QUESTIONS 1-9: 1
SUM OF ALL RESPONSES TO PHQ9 QUESTIONS 1 & 2: 1
SUM OF ALL RESPONSES TO PHQ QUESTIONS 1-9: 1

## 2020-07-31 NOTE — PROGRESS NOTES
Behavioral Health Consultation Follow-up  Micaela Box PsyD  Psychologist  7/31/2020  8:35 AM      Time spent with Patient: 30 minutes  This is patient's 11 th  [facility] appointment. Reason for Consult:  BLANCA, PDD, OCD  Referring Provider: Moreno Eric MD  [address] [facility], 122 [facility]    Feedback given to PCP. S:    Last appt: 7/24. More processing of need for accommodations. Will try to talk to Pablo Castillo () Monday to discuss if this is even an option. CBT intervention to manage excessive guilt that gets in way of self-care.      O:  MSE:    Appearance    alert, cooperative, crying  Appetite abnormal: okay  Sleep disturbance Yes  Fatigue Yes  Loss of pleasure Yes  Impulsive behavior No  Speech    normal rate, normal volume and well articulated  Mood    Worried about going back to classroom  Affect    Congruent with full range  Thought Content    intact  Thought Process    linear, goal directed and coherent  Associations    logical connections  Insight    Good  Judgment    Intact  Orientation    oriented to person, place, time, and general circumstances  Memory    recent and remote memory intact  Attention/Concentration    intact  Morbid ideation No  Suicide Assessment    no suicidal ideation    History:    Medications:   Current Outpatient Medications   Medication Sig Dispense Refill    albuterol sulfate HFA (VENTOLIN HFA) 108 (90 Base) MCG/ACT inhaler Inhale 2 puffs into the lungs every 4 hours as needed for Wheezing 1 Inhaler 4    amLODIPine (NORVASC) 5 MG tablet TAKE ONE TABLET BY MOUTH DAILY 30 tablet 5    buPROPion (WELLBUTRIN XL) 300 MG extended release tablet TAKE ONE TABLET BY MOUTH EVERY MORNING 30 tablet 11    FLUoxetine (PROZAC) 40 MG capsule TAKE ONE CAPSULE BY MOUTH DAILY 30 capsule 10    Cholecalciferol (VITAMIN D3) 2000 UNITS CAPS Take 2,000 Units by mouth three times a week      Nutritional Supplements (JUICE PLUS FIBRE PO) Take 1.5 g by mouth daily Indications: Takes Juice Plus      cetirizine (ZYRTEC) 10 MG tablet Take 10 mg by mouth daily       No current facility-administered medications for this visit. Social History:   Social History     Socioeconomic History    Marital status: Single     Spouse name: Not on file    Number of children: 0    Years of education: Not on file    Highest education level: Not on file   Occupational History    Not on file   Social Needs    Financial resource strain: Patient refused    Food insecurity     Worry: Patient refused     Inability: Patient refused    Transportation needs     Medical: Patient refused     Non-medical: Patient refused   Tobacco Use    Smoking status: Never Smoker    Smokeless tobacco: Never Used   Substance and Sexual Activity    Alcohol use: Yes     Comment: wine on weekends only    Drug use: No    Sexual activity: Not on file   Lifestyle    Physical activity     Days per week: Not on file     Minutes per session: Not on file    Stress: Not on file   Relationships    Social connections     Talks on phone: Not on file     Gets together: Not on file     Attends Confucianist service: Not on file     Active member of club or organization: Not on file     Attends meetings of clubs or organizations: Not on file     Relationship status: Not on file    Intimate partner violence     Fear of current or ex partner: Not on file     Emotionally abused: Not on file     Physically abused: Not on file     Forced sexual activity: Not on file   Other Topics Concern    Not on file   Social History Narrative    Not on file       TOBACCO:   reports that she has never smoked. She has never used smokeless tobacco.  ETOH:   reports current alcohol use.     Family History:   Family History   Problem Relation Age of Onset    Other Mother         MVP, Panic Disorder    Coronary Art Dis Father     High Blood Pressure Father     Heart Disease Father 36        MI    Diabetes Father 68        Type 2    Cancer Brother         Melanoma    High Blood Pressure Brother     Cancer Sister         Melanoma in situ    High Blood Pressure Sister     Brain Cancer Sister         Glioblastoma    Colon Cancer Paternal Grandmother      A:    See S: above    PHQ Scores 7/31/2020 7/24/2020 2/18/2020 2/18/2020 1/28/2020 1/7/2020 11/12/2019   PHQ2 Score 1 2 2 2 4 2 0   PHQ9 Score 1 2 2 2 13 2 0     Interpretation of Total Score Depression Severity: 1-4 = Minimal depression, 5-9 = Mild depression, 10-14 = Moderate depression, 15-19 = Moderately severe depression, 20-27 = Severe depression    BLANCA 7 SCORE 7/24/2020 2/18/2020 1/28/2020 11/7/2019 10/8/2019 8/7/2019 7/23/2019   BLANCA-7 Total Score 9 3 5 2 4 4 17     Interpretation of BLANCA-7 score: 5-9 = mild anxiety, 10-14 = moderate anxiety, 15+ = severe anxiety. Recommend referral to behavioral health for scores 10 or greater. Safety: No si/hi risk     Diagnosis:    BLANCA, PDD, OCD      Diagnosis Date    Allergic rhinitis     Asthma     Breast cancer ([facility] [address] 11/2012    [facility] of blood clots 01/2013    Lungs    Miscarriage 2004    Premenstrual dysphoric syndrome     Pulmonary embolus ([facility] [address] 1/2013     Problems with primary support group, Occupational problems and Other psychosocial and environmental problems    Plan:  Pt interventions:    Practiced assertive communication, Discussed self-care (sleep, nutrition, rewarding activities, social support, exercise), Discussed and problem-solved barriers in adhering to behavioral change plan and Supportive techniques    Pt Behavioral Change Plan:    1. Continue to schedule down time for yourself   2. Stay connected to your family any amount within COVID restrictions    3. Continue to work on walking daily, 30 minutes or 150 minutes per week physical movement to decrease depression/anxiety  4. Continue to take psychiatric medication as prescribed, go to Dr Angela Teixeira as needed  5.  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  Return to clinic for Dr Aleksey Constantino next week

## 2020-08-07 ENCOUNTER — OFFICE VISIT (OUTPATIENT)
Dept: PSYCHOLOGY | Age: 55
End: 2020-08-07
Payer: COMMERCIAL

## 2020-08-07 PROCEDURE — 90832 PSYTX W PT 30 MINUTES: CPT | Performed by: PSYCHOLOGIST

## 2020-08-07 ASSESSMENT — ANXIETY QUESTIONNAIRES
7. FEELING AFRAID AS IF SOMETHING AWFUL MIGHT HAPPEN: 1-SEVERAL DAYS
2. NOT BEING ABLE TO STOP OR CONTROL WORRYING: 0-NOT AT ALL
GAD7 TOTAL SCORE: 3
1. FEELING NERVOUS, ANXIOUS, OR ON EDGE: 1-SEVERAL DAYS
4. TROUBLE RELAXING: 0-NOT AT ALL
3. WORRYING TOO MUCH ABOUT DIFFERENT THINGS: 1-SEVERAL DAYS
5. BEING SO RESTLESS THAT IT IS HARD TO SIT STILL: 0-NOT AT ALL
6. BECOMING EASILY ANNOYED OR IRRITABLE: 0-NOT AT ALL

## 2020-08-07 ASSESSMENT — PATIENT HEALTH QUESTIONNAIRE - PHQ9
SUM OF ALL RESPONSES TO PHQ9 QUESTIONS 1 & 2: 0
SUM OF ALL RESPONSES TO PHQ QUESTIONS 1-9: 0
2. FEELING DOWN, DEPRESSED OR HOPELESS: 0
1. LITTLE INTEREST OR PLEASURE IN DOING THINGS: 0
SUM OF ALL RESPONSES TO PHQ QUESTIONS 1-9: 0

## 2020-08-07 NOTE — PROGRESS NOTES
intact  Morbid ideation No  Suicide Assessment    no suicidal ideation      History:    Medications:   Current Outpatient Medications   Medication Sig Dispense Refill    albuterol sulfate HFA (VENTOLIN HFA) 108 (90 Base) MCG/ACT inhaler Inhale 2 puffs into the lungs every 4 hours as needed for Wheezing 1 Inhaler 4    amLODIPine (NORVASC) 5 MG tablet TAKE ONE TABLET BY MOUTH DAILY 30 tablet 5    buPROPion (WELLBUTRIN XL) 300 MG extended release tablet TAKE ONE TABLET BY MOUTH EVERY MORNING 30 tablet 11    FLUoxetine (PROZAC) 40 MG capsule TAKE ONE CAPSULE BY MOUTH DAILY 30 capsule 10    Cholecalciferol (VITAMIN D3) 2000 UNITS CAPS Take 2,000 Units by mouth three times a week      Nutritional Supplements (JUICE PLUS FIBRE PO) Take 1.5 g by mouth daily Indications: Takes Juice Plus      cetirizine (ZYRTEC) 10 MG tablet Take 10 mg by mouth daily       No current facility-administered medications for this visit.         Social History:   Social History     Socioeconomic History    Marital status: Single     Spouse name: Not on file    Number of children: 0    Years of education: Not on file    Highest education level: Not on file   Occupational History    Not on file   Social Needs    Financial resource strain: Patient refused    Food insecurity     Worry: Patient refused     Inability: Patient refused    Transportation needs     Medical: Patient refused     Non-medical: Patient refused   Tobacco Use    Smoking status: Never Smoker    Smokeless tobacco: Never Used   Substance and Sexual Activity    Alcohol use: Yes     Comment: wine on weekends only    Drug use: No    Sexual activity: Not on file   Lifestyle    Physical activity     Days per week: Not on file     Minutes per session: Not on file    Stress: Not on file   Relationships    Social connections     Talks on phone: Not on file     Gets together: Not on file     Attends Worship service: Not on file     Active member of club or

## 2020-08-07 NOTE — PATIENT INSTRUCTIONS
1. Continue to schedule down time for yourself   2. Stay connected to your family any amount within COVID restrictions    3. Continue to work on walking daily, 30 minutes or 150 minutes per week physical movement to decrease depression/anxiety  4. Continue to take psychiatric medication as prescribed, go to Dr Anson Moseley as needed  5. 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  Return to clinic for Dr Rodolfo Link next week

## 2020-08-14 ENCOUNTER — OFFICE VISIT (OUTPATIENT)
Dept: PSYCHOLOGY | Age: 55
End: 2020-08-14
Payer: COMMERCIAL

## 2020-08-14 PROCEDURE — 90832 PSYTX W PT 30 MINUTES: CPT | Performed by: PSYCHOLOGIST

## 2020-08-14 ASSESSMENT — ANXIETY QUESTIONNAIRES
6. BECOMING EASILY ANNOYED OR IRRITABLE: 0-NOT AT ALL
7. FEELING AFRAID AS IF SOMETHING AWFUL MIGHT HAPPEN: 1-SEVERAL DAYS
GAD7 TOTAL SCORE: 6
1. FEELING NERVOUS, ANXIOUS, OR ON EDGE: 2-OVER HALF THE DAYS
2. NOT BEING ABLE TO STOP OR CONTROL WORRYING: 1-SEVERAL DAYS
3. WORRYING TOO MUCH ABOUT DIFFERENT THINGS: 1-SEVERAL DAYS
4. TROUBLE RELAXING: 1-SEVERAL DAYS
5. BEING SO RESTLESS THAT IT IS HARD TO SIT STILL: 0-NOT AT ALL

## 2020-08-14 ASSESSMENT — PATIENT HEALTH QUESTIONNAIRE - PHQ9
SUM OF ALL RESPONSES TO PHQ QUESTIONS 1-9: 2
1. LITTLE INTEREST OR PLEASURE IN DOING THINGS: 1
2. FEELING DOWN, DEPRESSED OR HOPELESS: 1
SUM OF ALL RESPONSES TO PHQ QUESTIONS 1-9: 2
SUM OF ALL RESPONSES TO PHQ9 QUESTIONS 1 & 2: 2

## 2020-08-14 NOTE — PROGRESS NOTES
Behavioral Health Consultation Follow-up  Jyo Page PsyD  Psychologist  8/14/2020  1:33 PM      Time spent with Patient: 30 minutes  This is patient's first  [facility] appointment. Reason for Consult:  BLANCA, PDD, OCD  Referring Provider: Jill Hernandez MD  [address] [facility], [address] [facility]    Feedback given to PCP. S:    Last appt: 8/7. 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 School has not followed through with some of the protocol steps yet, she is nervous things will not be in place in time. More discussion about importance of talk with boss about need for accommodation for her anxiety at some point. 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      O:  MSE:    Appearance    Teary eyed at times, alert, cooperative  Appetite abnormal: poor  Sleep disturbance Yes  Fatigue Yes  Loss of pleasure Yes  Impulsive behavior No  Speech    normal rate, normal volume and well articulated  Mood    Anxiety up a bit   Affect    normal affect  Thought Content    intact  Thought Process    linear, goal directed and coherent  Associations    logical connections  Insight    Good  Judgment    Intact  Orientation    oriented to person, place, time, and general circumstances  Memory    recent and remote memory intact  Attention/Concentration    intact  Morbid ideation No  Suicide Assessment    no suicidal ideation      History:    Medications:   Current Outpatient Medications   Medication Sig Dispense Refill    albuterol sulfate HFA (VENTOLIN HFA) 108 (90 Base) MCG/ACT inhaler Inhale 2 puffs into the lungs every 4 hours as needed for Wheezing 1 Inhaler 4    amLODIPine (NORVASC) 5 MG tablet TAKE ONE TABLET BY MOUTH DAILY 30 tablet 5    buPROPion (WELLBUTRIN XL) 300 MG extended release tablet TAKE ONE TABLET BY MOUTH EVERY MORNING 30 tablet 11    FLUoxetine (PROZAC) 40 MG capsule TAKE ONE CAPSULE BY MOUTH DAILY 30 capsule 10    Cholecalciferol (VITAMIN D3) 2000 UNITS CAPS Take 2,000 Units by mouth three times a week      Nutritional Supplements (JUICE PLUS FIBRE PO) Take 1.5 g by mouth daily Indications: Takes Juice Plus      cetirizine (ZYRTEC) 10 MG tablet Take 10 mg by mouth daily       No current facility-administered medications for this visit. Social History:   Social History     Socioeconomic History    Marital status: Single     Spouse name: Not on file    Number of children: 0    Years of education: Not on file    Highest education level: Not on file   Occupational History    Not on file   Social Needs    Financial resource strain: Patient refused    Food insecurity     Worry: Patient refused     Inability: Patient refused    Transportation needs     Medical: Patient refused     Non-medical: Patient refused   Tobacco Use    Smoking status: Never Smoker    Smokeless tobacco: Never Used   Substance and Sexual Activity    Alcohol use: Yes     Comment: wine on weekends only    Drug use: No    Sexual activity: Not on file   Lifestyle    Physical activity     Days per week: Not on file     Minutes per session: Not on file    Stress: Not on file   Relationships    Social connections     Talks on phone: Not on file     Gets together: Not on file     Attends Mormon service: Not on file     Active member of club or organization: Not on file     Attends meetings of clubs or organizations: Not on file     Relationship status: Not on file    Intimate partner violence     Fear of current or ex partner: Not on file     Emotionally abused: Not on file     Physically abused: Not on file     Forced sexual activity: Not on file   Other Topics Concern    Not on file   Social History Narrative    Not on file       TOBACCO:   reports that she has never smoked.  She has never used smokeless tobacco.  ETOH:   reports current alcohol use. Family History:   Family History   Problem Relation Age of Onset    Other Mother         MVP, Panic Disorder    Coronary Art Dis Father     High Blood Pressure Father     Heart Disease Father 36        MI    Diabetes Father 68        Type 2    Cancer Brother         Melanoma    High Blood Pressure Brother     Cancer Sister         Melanoma in situ    High Blood Pressure Sister     Brain Cancer Sister         Glioblastoma    Colon Cancer Paternal Grandmother      A:    See S: above    PHQ Scores 8/14/2020 8/7/2020 7/31/2020 7/24/2020 2/18/2020 2/18/2020 1/28/2020   PHQ2 Score 2 0 1 2 2 2 4   PHQ9 Score 2 0 1 2 2 2 13     Interpretation of Total Score Depression Severity: 1-4 = Minimal depression, 5-9 = Mild depression, 10-14 = Moderate depression, 15-19 = Moderately severe depression, 20-27 = Severe depression    BLANCA 7 SCORE 8/14/2020 8/7/2020 7/31/2020 7/24/2020 2/18/2020 1/28/2020 11/7/2019   BLANCA-7 Total Score 6 3 6 9 3 5 2     Interpretation of BLANCA-7 score: 5-9 = mild anxiety, 10-14 = moderate anxiety, 15+ = severe anxiety. Recommend referral to behavioral health for scores 10 or greater. Safety: no si/hi risk    Diagnosis:    BLANCA, OCD, PDD      Diagnosis Date    Allergic rhinitis     Asthma     Breast cancer ([facility] [address] 11/2012    [place]    Hx of blood clots 01/2013    Lungs    Miscarriage 2004    Premenstrual dysphoric syndrome     Pulmonary embolus ([facility] [address] 1/2013     Occupational problems    Plan:  Pt interventions:    Practiced assertive communication, Trained in strategies for increasing balanced thinking, Discussed self-care (sleep, nutrition, rewarding activities, social support, exercise), Discussed benefits of referral for specialty care, Discussed and problem-solved barriers in adhering to behavioral change plan and Supportive techniques    Pt Behavioral Change Plan:    1. Continue to schedule down time for yourself   2.  Stay connected to

## 2020-08-14 NOTE — PATIENT INSTRUCTIONS
1. Continue to schedule down time for yourself   2. Stay connected to your family any amount within COVID restrictions    3. Continue to work on walking daily, 30 minutes or 150 minutes per week physical movement to decrease depression/anxiety  4. Continue to take psychiatric medication as prescribed, go to Dr Alice Franz as needed  5. Assertively talk with boss about accommodations if school opens up classroom before our next appointment  6.  Assertively talk with building leader about protocol and experience of being on the \"frontlines\" versus focusing on the \"positive\"  7. Return to clinic for Dr Aleksey Constantino 2-[address] [place]

## 2020-10-23 ENCOUNTER — OFFICE VISIT (OUTPATIENT)
Dept: PSYCHOLOGY | Age: 55
End: 2020-10-23
Payer: COMMERCIAL

## 2020-10-23 PROCEDURE — 90832 PSYTX W PT 30 MINUTES: CPT | Performed by: PSYCHOLOGIST

## 2020-10-23 ASSESSMENT — ANXIETY QUESTIONNAIRES
GAD7 TOTAL SCORE: 7
6. BECOMING EASILY ANNOYED OR IRRITABLE: 1-SEVERAL DAYS
2. NOT BEING ABLE TO STOP OR CONTROL WORRYING: 1-SEVERAL DAYS
5. BEING SO RESTLESS THAT IT IS HARD TO SIT STILL: 0-NOT AT ALL
7. FEELING AFRAID AS IF SOMETHING AWFUL MIGHT HAPPEN: 2-OVER HALF THE DAYS
1. FEELING NERVOUS, ANXIOUS, OR ON EDGE: 1-SEVERAL DAYS
3. WORRYING TOO MUCH ABOUT DIFFERENT THINGS: 2-OVER HALF THE DAYS
4. TROUBLE RELAXING: 0-NOT AT ALL

## 2020-10-23 ASSESSMENT — PATIENT HEALTH QUESTIONNAIRE - PHQ9
1. LITTLE INTEREST OR PLEASURE IN DOING THINGS: 1
SUM OF ALL RESPONSES TO PHQ9 QUESTIONS 1 & 2: 2
2. FEELING DOWN, DEPRESSED OR HOPELESS: 1
SUM OF ALL RESPONSES TO PHQ QUESTIONS 1-9: 2

## 2020-10-23 NOTE — PROGRESS NOTES
Behavioral Health Consultation Follow-up  Tuyet Duncan PsyD  Psychologist  10/23/2020  9:16 AM      Time spent with Patient: 30 minutes  This is patient's second  [facility] appointment. Reason for Consult:  BLANCA, PDD, OCD  Referring Provider: Aleena Owusu MD  [address] [facility], [facility]    Feedback given to PCP. S:    Last appt: 8/14. 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 Working hard to stay connected socially, going to [employer] group 1 x per week right now and stays connected to family and best female friends. 6 months since sister passed away. Had memorial for her in September. More picking anxiety so this sparked deeper discussion again about readiness for more intensive OCD treatment. Pt asked that I reach out to colleague for referral to see if she is taking new patients. I will do this after our appt.      O:  MSE:    Appearance    alert, cooperative  Appetite abnormal: overeating  Sleep disturbance No  Fatigue No  Loss of pleasure No  Impulsive behavior No  Speech    normal rate, normal volume and well articulated  Mood    Anxiety up  Affect    normal affect  Thought Content    intact  Thought Process    linear, goal directed and coherent  Associations    logical connections  Insight    Good  Judgment    Intact  Orientation    oriented to person, place, time, and general circumstances  Memory    recent and remote memory intact  Attention/Concentration    intact  Morbid ideation No  Suicide Assessment    no suicidal ideation    History:    Medications:   Current Outpatient Medications   Medication Sig Dispense Refill    amLODIPine (NORVASC) 5 MG tablet TAKE ONE TABLET BY MOUTH DAILY 30 tablet 4    FLUoxetine (PROZAC) 40 MG capsule TAKE ONE CAPSULE BY MOUTH DAILY 30 capsule 9    albuterol sulfate HFA (VENTOLIN HFA) 108 (90 Base) MCG/ACT inhaler Inhale 2 puffs into the lungs every 4 hours as needed for Wheezing 1 Inhaler 4    buPROPion (WELLBUTRIN XL) 300 MG extended release tablet TAKE ONE TABLET BY MOUTH EVERY MORNING 30 tablet 11    Cholecalciferol (VITAMIN D3) 2000 UNITS CAPS Take 2,000 Units by mouth three times a week      Nutritional Supplements (JUICE PLUS FIBRE PO) Take 1.5 g by mouth daily Indications: Takes Juice Plus      cetirizine (ZYRTEC) 10 MG tablet Take 10 mg by mouth daily       No current facility-administered medications for this visit. Social History:   Social History     Socioeconomic History    Marital status: Single     Spouse name: Not on file    Number of children: 0    Years of education: Not on file    Highest education level: Not on file   Occupational History    Not on file   Social Needs    Financial resource strain: Patient refused    Food insecurity     Worry: Patient refused     Inability: Patient refused    Transportation needs     Medical: Patient refused     Non-medical: Patient refused   Tobacco Use    Smoking status: Never Smoker    Smokeless tobacco: Never Used   Substance and Sexual Activity    Alcohol use: Yes     Comment: wine on weekends only    Drug use: No    Sexual activity: Not on file   Lifestyle    Physical activity     Days per week: Not on file     Minutes per session: Not on file    Stress: Not on file   Relationships    Social connections     Talks on phone: Not on file     Gets together: Not on file     Attends Scientology service: Not on file     Active member of club or organization: Not on file     Attends meetings of clubs or organizations: Not on file     Relationship status: Not on file    Intimate partner violence     Fear of current or ex partner: Not on file     Emotionally abused: Not on file     Physically abused: Not on file     Forced sexual activity: Not on file   Other Topics Concern    Not on file   Social History Narrative    Not on file       TOBACCO:   reports that she has never smoked.  She has never used smokeless tobacco.  ETOH:   reports amount within COVID restrictions    3. Continue to work on walking daily, 30 minutes or 150 minutes per week physical movement to decrease depression/anxiety  4. Continue to take psychiatric medication as prescribed, go to Dr Mk Ashford as needed  5. Dr Mable Lara will reach out to colleague for picking/OCD treatment referral  6.  Return to clinic for Dr Mable Lara in 3-6 weeks

## 2020-11-17 ENCOUNTER — VIRTUAL VISIT (OUTPATIENT)
Dept: PSYCHOLOGY | Age: 55
End: 2020-11-17
Payer: COMMERCIAL

## 2020-11-17 PROCEDURE — 90834 PSYTX W PT 45 MINUTES: CPT | Performed by: PSYCHOLOGIST

## 2020-11-17 NOTE — PATIENT INSTRUCTIONS
1. Continue to schedule down time for yourself   2. Stay connected to your family any amount within COVID restrictions    3. Continue to work on walking daily, 30 minutes or 150 minutes per week physical movement to decrease depression/anxiety  4. Continue to take psychiatric medication as prescribed, go to Dr Royer Coleman as needed  5. Dr Kaycee Parnell will reach out to colleague for picking/OCD treatment referral  6.  Return to clinic for Dr Kaycee Parnell in 3-6 weeks, VV

## 2020-11-17 NOTE — PROGRESS NOTES
Behavioral Health Consultation Follow-up  Colt Stephenson PsyD  Psychologist  11/17/2020  3:50 PM     NOTE - this visit conducted via audiovisual means : MyChart, Doxy, etc, in accordance with CMS guidelines. During DPXNS-97 public health emergency. Visit initiated at patient or caregiver request with permission to bill to insurer granted. Telemedicine APA guidelines were reviewed and discussed. Patient gave verbal consent for teleservices and will sign a consent form when feasible. Location of provider: [facility]  Location of patient: home      Time spent with Patient: 39 minutes  This is patient's third  [facility] appointment. Reason for Consult:  BLANCA, PDD, OCD  Referring Provider: Deanne Casper MD  [address] [facility], [address] [place] [address]    Feedback given to PCP. S:    Last appt: 10/23. Changed to VV due to concerns about her being exposed to [place]. No symptoms but just taking precautions. Stressor: her dog, Gabriella had \"Human version of TIA. Other stressor: Step brother: his son was admitted to hospital for [place] for 5 days     Feeling some loneliness, identified some ways she can stay focused on health: getting outdoors 20 minutes, walking daily, focus on nutrition, stay connected to others socially even virtually if needed.      O:  MSE:    Appearance    alert, cooperative  Appetite abnormal: poor  Sleep disturbance Yes  Fatigue Yes  Loss of pleasure Yes  Impulsive behavior No  Speech    normal rate, normal volume and well articulated  Mood    Anxious  Affect    Congruent with full range  Thought Content    intact  Thought Process    linear, goal directed and coherent  Associations    logical connections  Insight    Good  Judgment    Intact  Orientation    oriented to person, place, time, and general circumstances  Memory    recent and remote memory intact  Attention/Concentration    intact  Morbid ideation No  Suicide Assessment    no suicidal ideation      History:    Medications: Current Outpatient Medications   Medication Sig Dispense Refill    amLODIPine (NORVASC) 5 MG tablet TAKE ONE TABLET BY MOUTH DAILY 30 tablet 4    FLUoxetine (PROZAC) 40 MG capsule TAKE ONE CAPSULE BY MOUTH DAILY 30 capsule 9    albuterol sulfate HFA (VENTOLIN HFA) 108 (90 Base) MCG/ACT inhaler Inhale 2 puffs into the lungs every 4 hours as needed for Wheezing 1 Inhaler 4    buPROPion (WELLBUTRIN XL) 300 MG extended release tablet TAKE ONE TABLET BY MOUTH EVERY MORNING 30 tablet 11    Cholecalciferol (VITAMIN D3) 2000 UNITS CAPS Take 2,000 Units by mouth three times a week      Nutritional Supplements (JUICE PLUS FIBRE PO) Take 1.5 g by mouth daily Indications: Takes Juice Plus      cetirizine (ZYRTEC) 10 MG tablet Take 10 mg by mouth daily       No current facility-administered medications for this visit.         Social History:   Social History     Socioeconomic History    Marital status: Single     Spouse name: Not on file    Number of children: 0    Years of education: Not on file    Highest education level: Not on file   Occupational History    Not on file   Social Needs    Financial resource strain: Patient refused    Food insecurity     Worry: Patient refused     Inability: Patient refused    Transportation needs     Medical: Patient refused     Non-medical: Patient refused   Tobacco Use    Smoking status: Never Smoker    Smokeless tobacco: Never Used   Substance and Sexual Activity    Alcohol use: Yes     Comment: wine on weekends only    Drug use: No    Sexual activity: Not on file   Lifestyle    Physical activity     Days per week: Not on file     Minutes per session: Not on file    Stress: Not on file   Relationships    Social connections     Talks on phone: Not on file     Gets together: Not on file     Attends Yazdanism service: Not on file     Active member of club or organization: Not on file     Attends meetings of clubs or organizations: Not on file     Relationship status: Not on file    Intimate partner violence     Fear of current or ex partner: Not on file     Emotionally abused: Not on file     Physically abused: Not on file     Forced sexual activity: Not on file   Other Topics Concern    Not on file   Social History Narrative    Not on file       TOBACCO:   reports that she has never smoked. She has never used smokeless tobacco.  ETOH:   reports current alcohol use. Family History:   Family History   Problem Relation Age of Onset    Other Mother         MVP, Panic Disorder    Coronary Art Dis Father     High Blood Pressure Father     Heart Disease Father 36        MI    Diabetes Father 68        Type 2    Cancer Brother         Melanoma    High Blood Pressure Brother     Cancer Sister         Melanoma in situ    High Blood Pressure Sister     Brain Cancer Sister         Glioblastoma    Colon Cancer Paternal Grandmother      A:    See S: above    Diagnosis:    BLANCA, PDD, OCD      Diagnosis Date    Allergic rhinitis     Asthma     Breast cancer ([facility] [address] 11/2012    [facility] of blood clots 01/2013    Lungs    Miscarriage 2004    Premenstrual dysphoric syndrome     Pulmonary embolus ([facility] [address] 1/2013     Problems with primary support group, Problems related to the social environment and Other psychosocial and environmental problems    Safety: no si/hi risk    Plan:  Pt interventions:    Practiced assertive communication, Trained in strategies for increasing balanced thinking, Discussed self-care (sleep, nutrition, rewarding activities, social support, exercise) and Supportive techniques    Pt Behavioral Change Plan:    1. Continue to schedule down time for yourself   2. Stay connected to your family any amount within COVID restrictions    3. Continue to work on walking daily, 30 minutes or 150 minutes per week physical movement to decrease depression/anxiety  4. Continue to take psychiatric medication as prescribed, go to Dr Arturo Sharp as needed  5.  Dr Latonya Blum will reach out to colleague for picking/OCD treatment referral  6.  Return to clinic for Dr Cj Amador in 3-6 weeks, VV

## 2021-01-08 ENCOUNTER — OFFICE VISIT (OUTPATIENT)
Dept: PSYCHOLOGY | Age: 56
End: 2021-01-08
Payer: COMMERCIAL

## 2021-01-08 DIAGNOSIS — F42.4 HABITUAL SELF-EXCORIATION: ICD-10-CM

## 2021-01-08 DIAGNOSIS — F41.1 GENERALIZED ANXIETY DISORDER: Primary | ICD-10-CM

## 2021-01-08 DIAGNOSIS — F34.1 PERSISTENT DEPRESSIVE DISORDER: ICD-10-CM

## 2021-01-08 PROCEDURE — 90834 PSYTX W PT 45 MINUTES: CPT | Performed by: PSYCHOLOGIST

## 2021-01-08 ASSESSMENT — ANXIETY QUESTIONNAIRES
1. FEELING NERVOUS, ANXIOUS, OR ON EDGE: 3-NEARLY EVERY DAY
GAD7 TOTAL SCORE: 11
2. NOT BEING ABLE TO STOP OR CONTROL WORRYING: 2-OVER HALF THE DAYS
4. TROUBLE RELAXING: 2-OVER HALF THE DAYS

## 2021-01-08 ASSESSMENT — PATIENT HEALTH QUESTIONNAIRE - PHQ9
SUM OF ALL RESPONSES TO PHQ9 QUESTIONS 1 & 2: 6
6. FEELING BAD ABOUT YOURSELF - OR THAT YOU ARE A FAILURE OR HAVE LET YOURSELF OR YOUR FAMILY DOWN: 1
3. TROUBLE FALLING OR STAYING ASLEEP: 2
SUM OF ALL RESPONSES TO PHQ QUESTIONS 1-9: 13
1. LITTLE INTEREST OR PLEASURE IN DOING THINGS: 3
7. TROUBLE CONCENTRATING ON THINGS, SUCH AS READING THE NEWSPAPER OR WATCHING TELEVISION: 0

## 2021-01-08 ASSESSMENT — COLUMBIA-SUICIDE SEVERITY RATING SCALE - C-SSRS: 6. HAVE YOU EVER DONE ANYTHING, STARTED TO DO ANYTHING, OR PREPARED TO DO ANYTHING TO END YOUR LIFE?: NO
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
## 2021-01-19 ENCOUNTER — OFFICE VISIT (OUTPATIENT)
Dept: PSYCHOLOGY | Age: 56
End: 2021-01-19
Payer: COMMERCIAL

## 2021-01-19 DIAGNOSIS — F34.1 PERSISTENT DEPRESSIVE DISORDER: ICD-10-CM

## 2021-01-19 DIAGNOSIS — F41.1 GENERALIZED ANXIETY DISORDER: Primary | ICD-10-CM

## 2021-01-19 DIAGNOSIS — F42.4 HABITUAL SELF-EXCORIATION: ICD-10-CM

## 2021-01-19 PROCEDURE — 90834 PSYTX W PT 45 MINUTES: CPT | Performed by: PSYCHOLOGIST

## 2021-01-19 ASSESSMENT — PATIENT HEALTH QUESTIONNAIRE - PHQ9
8. MOVING OR SPEAKING SO SLOWLY THAT OTHER PEOPLE COULD HAVE NOTICED. OR THE OPPOSITE, BEING SO FIGETY OR RESTLESS THAT YOU HAVE BEEN MOVING AROUND A LOT MORE THAN USUAL: 1
SUM OF ALL RESPONSES TO PHQ QUESTIONS 1-9: 14
9. THOUGHTS THAT YOU WOULD BE BETTER OFF DEAD, OR OF HURTING YOURSELF: 0
10. IF YOU CHECKED OFF ANY PROBLEMS, HOW DIFFICULT HAVE THESE PROBLEMS MADE IT FOR YOU TO DO YOUR WORK, TAKE CARE OF THINGS AT HOME, OR GET ALONG WITH OTHER PEOPLE: 2
SUM OF ALL RESPONSES TO PHQ9 QUESTIONS 1 & 2: 4
7. TROUBLE CONCENTRATING ON THINGS, SUCH AS READING THE NEWSPAPER OR WATCHING TELEVISION: 2

## 2021-01-19 ASSESSMENT — ANXIETY QUESTIONNAIRES
1. FEELING NERVOUS, ANXIOUS, OR ON EDGE: 3-NEARLY EVERY DAY
7. FEELING AFRAID AS IF SOMETHING AWFUL MIGHT HAPPEN: 2-OVER HALF THE DAYS

## 2021-01-19 ASSESSMENT — COLUMBIA-SUICIDE SEVERITY RATING SCALE - C-SSRS
1. WITHIN THE PAST MONTH, HAVE YOU WISHED YOU WERE DEAD OR WISHED YOU COULD GO TO SLEEP AND NOT WAKE UP?: NO
2. HAVE YOU ACTUALLY HAD ANY THOUGHTS OF KILLING YOURSELF?: NO
6. HAVE YOU EVER DONE ANYTHING, STARTED TO DO ANYTHING, OR PREPARED TO DO ANYTHING TO END YOUR LIFE?: NO

## 2021-01-19 NOTE — PATIENT INSTRUCTIONS
1. Continue to schedule down time for yourself   2. Stay connected to your family any amount within COVID restrictions    3. Continue to work on walking daily, 30 minutes or 150 minutes per week physical movement to decrease depression/anxiety  4. Continue to take psychiatric medication as prescribed, go to Dr Tray Isaac as needed  5. Continue to work with Tammy Quiroz, [facility] on the next steps for OCD consult over the next few weeks  6.  Return to clinic for Dr Светлана Rizvi in 1 week

## 2021-01-19 NOTE — PROGRESS NOTES
Behavioral Health Consultation Follow-up  Dustin Cali PsyD  Psychologist  1/19/2021  9:38 AM      Time spent with Patient: 40 minutes  This is patient's fifth  [facility] appointment. Reason for Consult:  BLANCA, OCD, PDD  Referring Provider: Marko Lira MD  [facility],  [facility]    Feedback given to PCP. S:    Last appt: 1/8. Pt completed intake paperwork for OCD specialist. New pt appt not scheduled yet but they are working on this which is good news. Rest of appt focused on news that I am leaving the office and the boundaries around how I can support her. Understands there is new psychologist coming to office in February. We will continue to clarify treatment plan over our last few appts. My hope is that she will be in more aggressive OCD treatment with new therapist. She is nervous about this but this has been one of our longer term goals for some time. She is ready! FMLA: renewed and updated so when she begins more treatment she will have time off work needed for it.      O:  MSE:    Appearance    alert, cooperative, crying  Appetite abnormal: poor, overeating   Sleep disturbance Yes  Fatigue Yes  Loss of pleasure Yes  Impulsive behavior No  Speech    normal rate, normal volume and well articulated  Mood    Anxious  Guilty  Depressed  Low self-esteem  Affect    Congruent with full range  Thought Content    intact  Thought Process    linear, goal directed and coherent  Associations    logical connections  Insight    Good  Judgment    Intact  Orientation    oriented to person, place, time, and general circumstances  Memory    recent and remote memory intact  Attention/Concentration    intact  Morbid ideation No  Suicide Assessment    no suicidal ideation      History:    Medications:   Current Outpatient Medications   Medication Sig Dispense Refill    buPROPion (WELLBUTRIN XL) 300 MG extended release tablet TAKE ONE TABLET BY MOUTH EVERY MORNING 30 tablet 3  amLODIPine (NORVASC) 5 MG tablet TAKE ONE TABLET BY MOUTH DAILY 30 tablet 4    FLUoxetine (PROZAC) 40 MG capsule TAKE ONE CAPSULE BY MOUTH DAILY 30 capsule 9    albuterol sulfate HFA (VENTOLIN HFA) 108 (90 Base) MCG/ACT inhaler Inhale 2 puffs into the lungs every 4 hours as needed for Wheezing 1 Inhaler 4    Cholecalciferol (VITAMIN D3) 2000 UNITS CAPS Take 2,000 Units by mouth three times a week      Nutritional Supplements (JUICE PLUS FIBRE PO) Take 1.5 g by mouth daily Indications: Takes Juice Plus      cetirizine (ZYRTEC) 10 MG tablet Take 10 mg by mouth daily       No current facility-administered medications for this visit.         Social History:   Social History     Socioeconomic History    Marital status: Single     Spouse name: Not on file    Number of children: 0    Years of education: Not on file    Highest education level: Not on file   Occupational History    Not on file   Social Needs    Financial resource strain: Patient refused    Food insecurity     Worry: Patient refused     Inability: Patient refused    Transportation needs     Medical: Patient refused     Non-medical: Patient refused   Tobacco Use    Smoking status: Never Smoker    Smokeless tobacco: Never Used   Substance and Sexual Activity    Alcohol use: Yes     Comment: wine on weekends only    Drug use: No    Sexual activity: Not on file   Lifestyle    Physical activity     Days per week: Not on file     Minutes per session: Not on file    Stress: Not on file   Relationships    Social connections     Talks on phone: Not on file     Gets together: Not on file     Attends [employer] service: Not on file     Active member of club or organization: Not on file     Attends meetings of clubs or organizations: Not on file     Relationship status: Not on file    Intimate partner violence     Fear of current or ex partner: Not on file     Emotionally abused: Not on file     Physically abused: Not on file Forced sexual activity: Not on file   Other Topics Concern    Not on file   Social History Narrative    Not on file       TOBACCO:   reports that she has never smoked. She has never used smokeless tobacco.  ETOH:   reports current alcohol use. Family History:   Family History   Problem Relation Age of Onset    Other Mother         MVP, Panic Disorder    Coronary Art Dis Father     High Blood Pressure Father     Heart Disease Father 36        MI    Diabetes Father 68        Type 2    Cancer Brother         Melanoma    High Blood Pressure Brother     Cancer Sister         Melanoma in situ    High Blood Pressure Sister     Brain Cancer Sister         Glioblastoma    Colon Cancer Paternal Grandmother        A:    See S: above    PHQ Scores 1/19/2021 1/8/2021 10/23/2020 8/14/2020 8/7/2020 7/31/2020 7/24/2020   PHQ2 Score 4 6 2 2 0 1 2   PHQ9 Score 14 13 2 2 0 1 2     Interpretation of Total Score Depression Severity: 1-4 = Minimal depression, 5-9 = Mild depression, 10-14 = Moderate depression, 15-19 = Moderately severe depression, 20-27 = Severe depression    BLANCA 7 SCORE 1/19/2021 1/8/2021 10/23/2020 8/14/2020 8/7/2020 7/31/2020 7/24/2020   BLANCA-7 Total Score 12 11 7 6 3 6 9     Interpretation of BLANCA-7 score: 5-9 = mild anxiety, 10-14 = moderate anxiety, 15+ = severe anxiety. Recommend referral to behavioral health for scores 10 or greater.     Safety: denied any si/hi risk    Diagnosis:    BLANCA, OCD, excoriation; Persistent depressive disorder       Diagnosis Date    Allergic rhinitis     Asthma     Breast cancer ([facility] [address] 11/2012    [place]    Hx of blood clots 01/2013    Lungs    Miscarriage 2004    Premenstrual dysphoric syndrome     Pulmonary embolus ([facility] [address] 1/2013     Problems with primary support group and Problems related to the social environment    Plan:  Pt interventions: Practiced assertive communication, Trained in strategies for increasing balanced thinking, Discussed self-care (sleep, nutrition, rewarding activities, social support, exercise), Discussed benefits of referral for specialty care, Motivational Interviewing to increase patient confidence and compliance with adhering to behavioral change plan and Supportive techniques    Pt Behavioral Change Plan:    1. Continue to schedule down time for yourself   2. Stay connected to your family any amount within COVID restrictions    3. Continue to work on walking daily, 30 minutes or 150 minutes per week physical movement to decrease depression/anxiety  4. Continue to take psychiatric medication as prescribed, go to Dr Dione Garcia as needed  5. Continue to work with Yina Pardo on the next steps for OCD consult over the next few weeks  6.  Return to clinic for Dr Henry Acuna in 1 week

## 2021-01-19 NOTE — Clinical Note
Eliazar,  Tearful appt today having to tell her I'm leaving. Good news: we were already working on more aggressive OCD treatment for skin picking. Me leaving is going to solidify that this is in fact the right next step. 2 more appts over the next month.   Ngoc

## 2021-01-26 ENCOUNTER — OFFICE VISIT (OUTPATIENT)
Dept: PSYCHOLOGY | Age: 56
End: 2021-01-26
Payer: COMMERCIAL

## 2021-01-26 DIAGNOSIS — F34.1 PERSISTENT DEPRESSIVE DISORDER: ICD-10-CM

## 2021-01-26 DIAGNOSIS — F41.1 GENERALIZED ANXIETY DISORDER: Primary | ICD-10-CM

## 2021-01-26 DIAGNOSIS — F42.4 HABITUAL SELF-EXCORIATION: ICD-10-CM

## 2021-01-26 PROCEDURE — 90832 PSYTX W PT 30 MINUTES: CPT | Performed by: PSYCHOLOGIST

## 2021-01-26 ASSESSMENT — PATIENT HEALTH QUESTIONNAIRE - PHQ9
SUM OF ALL RESPONSES TO PHQ9 QUESTIONS 1 & 2: 2
SUM OF ALL RESPONSES TO PHQ QUESTIONS 1-9: 2
2. FEELING DOWN, DEPRESSED OR HOPELESS: 1

## 2021-01-26 ASSESSMENT — ANXIETY QUESTIONNAIRES
4. TROUBLE RELAXING: 1-SEVERAL DAYS
3. WORRYING TOO MUCH ABOUT DIFFERENT THINGS: 2-OVER HALF THE DAYS
7. FEELING AFRAID AS IF SOMETHING AWFUL MIGHT HAPPEN: 1-SEVERAL DAYS
2. NOT BEING ABLE TO STOP OR CONTROL WORRYING: 2-OVER HALF THE DAYS
5. BEING SO RESTLESS THAT IT IS HARD TO SIT STILL: 0-NOT AT ALL

## 2021-01-26 NOTE — PROGRESS NOTES
Behavioral Health Consultation Follow-up  Roberto Flower PsyD  Psychologist  1/26/2021  2:37 PM      Time spent with Patient: 30 minutes  This is patient's sixth  [facility] appointment. Reason for Consult:  BLANCA, OCD, PDD  Referring Provider: Eugenia Majano MD  [address] [facility],  [address] [place] [address]    Feedback given to PCP. S:    Last appt: 1/19. More motivational interviewing interventions to get pt ready for more focused OCD ERP treatment with Nabila Horn. Pt is communicating with OCD specialist via e-mail. We are hoping to have new patient appt with [address] [facility] over the next 2 weeks as we terminate our treatment together.     O:  MSE:    Appearance    alert, cooperative  Appetite abnormal: poor  Sleep disturbance Yes  Fatigue Yes  Loss of pleasure better  Impulsive behavior No  Speech    normal rate, normal volume and well articulated  Mood    Stress levels down a bit   Affect    normal affect  Thought Content    intact  Thought Process    linear, goal directed and coherent  Associations    logical connections  Insight    Good  Judgment    Intact  Orientation    oriented to person, place, time, and general circumstances  Memory    recent and remote memory intact  Attention/Concentration    intact  Morbid ideation No  Suicide Assessment    no suicidal ideation    History:    Medications:   Current Outpatient Medications   Medication Sig Dispense Refill    buPROPion (WELLBUTRIN XL) 300 MG extended release tablet TAKE ONE TABLET BY MOUTH EVERY MORNING 30 tablet 3    amLODIPine (NORVASC) 5 MG tablet TAKE ONE TABLET BY MOUTH DAILY 30 tablet 4    FLUoxetine (PROZAC) 40 MG capsule TAKE ONE CAPSULE BY MOUTH DAILY 30 capsule 9    albuterol sulfate HFA (VENTOLIN HFA) 108 (90 Base) MCG/ACT inhaler Inhale 2 puffs into the lungs every 4 hours as needed for Wheezing 1 Inhaler 4    Cholecalciferol (VITAMIN D3) 2000 UNITS CAPS Take 2,000 Units by mouth three times a week  Nutritional Supplements (JUICE PLUS FIBRE PO) Take 1.5 g by mouth daily Indications: Takes Juice Plus      cetirizine (ZYRTEC) 10 MG tablet Take 10 mg by mouth daily       No current facility-administered medications for this visit. Social History:   Social History     Socioeconomic History    Marital status: Single     Spouse name: Not on file    Number of children: 0    Years of education: Not on file    Highest education level: Not on file   Occupational History    Not on file   Social Needs    Financial resource strain: Patient refused    Food insecurity     Worry: Patient refused     Inability: Patient refused    Transportation needs     Medical: Patient refused     Non-medical: Patient refused   Tobacco Use    Smoking status: Never Smoker    Smokeless tobacco: Never Used   Substance and Sexual Activity    Alcohol use: Yes     Comment: wine on weekends only    Drug use: No    Sexual activity: Not on file   Lifestyle    Physical activity     Days per week: Not on file     Minutes per session: Not on file    Stress: Not on file   Relationships    Social connections     Talks on phone: Not on file     Gets together: Not on file     Attends Dru[employer] service: Not on file     Active member of club or organization: Not on file     Attends meetings of clubs or organizations: Not on file     Relationship status: Not on file    Intimate partner violence     Fear of current or ex partner: Not on file     Emotionally abused: Not on file     Physically abused: Not on file     Forced sexual activity: Not on file   Other Topics Concern    Not on file   Social History Narrative    Not on file       TOBACCO:   reports that she has never smoked. She has never used smokeless tobacco.  ETOH:   reports current alcohol use.     Family History:   Family History   Problem Relation Age of Onset    Other Mother         MVP, Panic Disorder    Coronary Art Dis Father     High Blood Pressure Father  Heart Disease Father 36        MI    Diabetes Father 68        Type 2    Cancer Brother         Melanoma    High Blood Pressure Brother     Cancer Sister         Melanoma in situ    High Blood Pressure Sister     Brain Cancer Sister         Glioblastoma    Colon Cancer Paternal Grandmother      A:    See S: above    PHQ Scores 1/26/2021 1/19/2021 1/8/2021 10/23/2020 8/14/2020 8/7/2020 7/31/2020   PHQ2 Score 2 4 6 2 2 0 1   PHQ9 Score 2 14 13 2 2 0 1     Interpretation of Total Score Depression Severity: 1-4 = Minimal depression, 5-9 = Mild depression, 10-14 = Moderate depression, 15-19 = Moderately severe depression, 20-27 = Severe depression    BLANCA 7 SCORE 1/26/2021 1/19/2021 1/8/2021 10/23/2020 8/14/2020 8/7/2020 7/31/2020   BLANCA-7 Total Score 7 12 11 7 6 3 6     Interpretation of BLANCA-7 score: 5-9 = mild anxiety, 10-14 = moderate anxiety, 15+ = severe anxiety. Recommend referral to behavioral health for scores 10 or greater. Safety: denied any si/hi risk    Diagnosis:    BLANCA, OCD excoriation; PDD      Diagnosis Date    Allergic rhinitis     Asthma     Breast cancer ([facility] [address] 11/2012    [facility] of blood clots 01/2013    Lungs    Miscarriage 2004    Premenstrual dysphoric syndrome     Pulmonary embolus ([facility] [address] 1/2013     Problems related to the social environment    Plan:  Pt interventions:    Practiced assertive communication, Trained in strategies for increasing balanced thinking, Discussed self-care (sleep, nutrition, rewarding activities, social support, exercise), Discussed benefits of referral for specialty care, Motivational Interviewing to increase patient confidence and compliance with adhering to behavioral change plan and Supportive techniques    Pt Behavioral Change Plan:  1. Continue to schedule down time for yourself   2.  Stay connected to your family any amount within COVID restrictions   3. Continue to work on walking daily, 30 minutes or 150 minutes per week physical movement to decrease depression/anxiety  4. Continue to take psychiatric medication as prescribed, go to Dr Sidhu Overall as needed  5. [facility] OCD consult over the next few weeks  6.  Return to clinic for Dr Baldo Mesa in 1 week

## 2021-01-26 NOTE — PATIENT INSTRUCTIONS
1. Continue to schedule down time for yourself   2. Stay connected to your family any amount within COVID restrictions    3. Continue to work on walking daily, 30 minutes or 150 minutes per week physical movement to decrease depression/anxiety  4. Continue to take psychiatric medication as prescribed, go to Dr Jerardo Simons as needed  5. Continue to work with Marilu [facility] on the next steps for OCD consult over the next few weeks  6.  Return to clinic for Dr Elvis Anders in 1 week

## 2021-02-02 ENCOUNTER — OFFICE VISIT (OUTPATIENT)
Dept: PSYCHOLOGY | Age: 56
End: 2021-02-02
Payer: COMMERCIAL

## 2021-02-02 DIAGNOSIS — F42.4 HABITUAL SELF-EXCORIATION: ICD-10-CM

## 2021-02-02 DIAGNOSIS — F41.1 GENERALIZED ANXIETY DISORDER: Primary | ICD-10-CM

## 2021-02-02 PROCEDURE — 90832 PSYTX W PT 30 MINUTES: CPT | Performed by: PSYCHOLOGIST

## 2021-02-12 ENCOUNTER — OFFICE VISIT (OUTPATIENT)
Dept: PSYCHOLOGY | Age: 56
End: 2021-02-12
Payer: COMMERCIAL

## 2021-02-12 DIAGNOSIS — F41.1 GENERALIZED ANXIETY DISORDER: Primary | ICD-10-CM

## 2021-02-12 DIAGNOSIS — F42.4 HABITUAL SELF-EXCORIATION: ICD-10-CM

## 2021-02-12 PROCEDURE — 90832 PSYTX W PT 30 MINUTES: CPT | Performed by: PSYCHOLOGIST

## 2021-02-12 NOTE — PROGRESS NOTES
Behavioral Health Consultation Follow-up  Edmond Cardona PsyD  Psychologist  2/12/2021  9:13 AM      Time spent with Patient: 35 minutes  This is patient's seventh  [facility] appointment. Reason for Consult:  BLANCA, OCD, PDD  Referring Provider: Brandi Bauman MD  [address] [facility],  [address] [place] [address]    Feedback given to PCP. S:    Last appt: 1/26. Continues to work on connecting with new OCD specialist, LANCE Tompkins Scheduling right now. Did discuss another referral option: reviewed together: Dr Meredith Marcano who is also OCD specialist. She will consider as back up. Rest of time focused on saying good bye and reviewing treatment progress and her next steps in terms of readiness for OCD treatment.      O:  MSE:    Appearance    Tearful at times , alert, cooperative  Appetite abnormal: poor  Sleep disturbance better  Fatigue better  Loss of pleasure No  Impulsive behavior No  Speech    normal rate, normal volume and well articulated  Mood    Stable, managing   Affect    normal affect  Thought Content    intact  Thought Process    linear, goal directed and coherent  Associations    logical connections  Insight    Good  Judgment    Intact  Orientation    oriented to person, place, time, and general circumstances  Memory    recent and remote memory intact  Attention/Concentration    intact  Morbid ideation No  Suicide Assessment    no suicidal ideation    History:    Medications:   Current Outpatient Medications   Medication Sig Dispense Refill    buPROPion (WELLBUTRIN XL) 300 MG extended release tablet TAKE ONE TABLET BY MOUTH EVERY MORNING 30 tablet 3    amLODIPine (NORVASC) 5 MG tablet TAKE ONE TABLET BY MOUTH DAILY 30 tablet 4    FLUoxetine (PROZAC) 40 MG capsule TAKE ONE CAPSULE BY MOUTH DAILY 30 capsule 9    albuterol sulfate HFA (VENTOLIN HFA) 108 (90 Base) MCG/ACT inhaler Inhale 2 puffs into the lungs every 4 hours as needed for Wheezing 1 Inhaler 4  Cholecalciferol (VITAMIN D3) 2000 UNITS CAPS Take 2,000 Units by mouth three times a week      Nutritional Supplements (JUICE PLUS FIBRE PO) Take 1.5 g by mouth daily Indications: Takes Juice Plus      cetirizine (ZYRTEC) 10 MG tablet Take 10 mg by mouth daily       No current facility-administered medications for this visit. Social History:   Social History     Socioeconomic History    Marital status: Single     Spouse name: Not on file    Number of children: 0    Years of education: Not on file    Highest education level: Not on file   Occupational History    Not on file   Social Needs    Financial resource strain: Patient refused    Food insecurity     Worry: Patient refused     Inability: Patient refused    Transportation needs     Medical: Patient refused     Non-medical: Patient refused   Tobacco Use    Smoking status: Never Smoker    Smokeless tobacco: Never Used   Substance and Sexual Activity    Alcohol use: Yes     Comment: wine on weekends only    Drug use: No    Sexual activity: Not on file   Lifestyle    Physical activity     Days per week: Not on file     Minutes per session: Not on file    Stress: Not on file   Relationships    Social connections     Talks on phone: Not on file     Gets together: Not on file     Attends [employer] service: Not on file     Active member of club or organization: Not on file     Attends meetings of clubs or organizations: Not on file     Relationship status: Not on file    Intimate partner violence     Fear of current or ex partner: Not on file     Emotionally abused: Not on file     Physically abused: Not on file     Forced sexual activity: Not on file   Other Topics Concern    Not on file   Social History Narrative    Not on file       TOBACCO:   reports that she has never smoked. She has never used smokeless tobacco.  ETOH:   reports current alcohol use.     Family History:   Family History   Problem Relation Age of Onset 2. Stay connected to your family any amount within COVID restrictions    3. Continue to work on walking daily, 30 minutes or 150 minutes per week physical movement to decrease depression/anxiety  4. Continue to take psychiatric medication as prescribed, go to Dr Angi Collado as needed  5. Continue to work with [facility] on the next steps for OCD consult over the next few weeks  6. No further follow up with Dr Phong Guzman required, understands last day is today.  Aware Dr Matthew Conley will be taking over services in office, will reach to him if needed

## 2021-02-12 NOTE — PATIENT INSTRUCTIONS
1. Continue to schedule down time for yourself   2. Stay connected to your family any amount within COVID restrictions    3. Continue to work on walking daily, 30 minutes or 150 minutes per week physical movement to decrease depression/anxiety  4. Continue to take psychiatric medication as prescribed, go to Dr Natividad Buchanan as needed  5. Continue to work with [facility] on the next steps for OCD consult over the next few weeks  6.  Return to clinic for Dr Rashid Panchal for our last consult next week

## 2021-02-12 NOTE — PROGRESS NOTES
Behavioral Health Consultation Follow-up  Malathi Gao PsyD  Psychologist  2/12/2021  5:03 PM      Time spent with Patient: 35 minutes  This is patient's seventh  [facility] appointment. Reason for Consult:  BLANCA, OCD, excoriation   Referring Provider: Anderson Sharp MD  [address] [facility],  [address] [place] [address]    Feedback given to PCP. S:    Last appt; 1/26. News that I am leaving office, process normal grief reaction to this. Reviewed treatment progress.  More focus on next steps with new OCD specialist.     O:  MSE:    Appearance    alert, cooperative, crying  Appetite abnormal: poor  Sleep disturbance Yes  Fatigue Yes  Loss of pleasure yes  Impulsive behavior No  Speech    normal rate, normal volume and well articulated  Mood   Anxious   Affect    Congruent with full range  Thought Content    intact  Thought Process    linear, goal directed and coherent  Associations    logical connections  Insight    Good  Judgment    Intact  Orientation    oriented to person, place, time, and general circumstances  Memory    recent and remote memory intact  Attention/Concentration    intact  Morbid ideation No  Suicide Assessment    no suicidal ideation      History:    Medications:   Current Outpatient Medications   Medication Sig Dispense Refill    buPROPion (WELLBUTRIN XL) 300 MG extended release tablet TAKE ONE TABLET BY MOUTH EVERY MORNING 30 tablet 3    amLODIPine (NORVASC) 5 MG tablet TAKE ONE TABLET BY MOUTH DAILY 30 tablet 4    FLUoxetine (PROZAC) 40 MG capsule TAKE ONE CAPSULE BY MOUTH DAILY 30 capsule 9    albuterol sulfate HFA (VENTOLIN HFA) 108 (90 Base) MCG/ACT inhaler Inhale 2 puffs into the lungs every 4 hours as needed for Wheezing 1 Inhaler 4    Cholecalciferol (VITAMIN D3) 2000 UNITS CAPS Take 2,000 Units by mouth three times a week      Nutritional Supplements (JUICE PLUS FIBRE PO) Take 1.5 g by mouth daily Indications: Takes Juice Plus  cetirizine (ZYRTEC) 10 MG tablet Take 10 mg by mouth daily       No current facility-administered medications for this visit. Social History:   Social History     Socioeconomic History    Marital status: Single     Spouse name: Not on file    Number of children: 0    Years of education: Not on file    Highest education level: Not on file   Occupational History    Not on file   Social Needs    Financial resource strain: Patient refused    Food insecurity     Worry: Patient refused     Inability: Patient refused    Transportation needs     Medical: Patient refused     Non-medical: Patient refused   Tobacco Use    Smoking status: Never Smoker    Smokeless tobacco: Never Used   Substance and Sexual Activity    Alcohol use: Yes     Comment: wine on weekends only    Drug use: No    Sexual activity: Not on file   Lifestyle    Physical activity     Days per week: Not on file     Minutes per session: Not on file    Stress: Not on file   Relationships    Social connections     Talks on phone: Not on file     Gets together: Not on file     Attends Adventism service: Not on file     Active member of club or organization: Not on file     Attends meetings of clubs or organizations: Not on file     Relationship status: Not on file    Intimate partner violence     Fear of current or ex partner: Not on file     Emotionally abused: Not on file     Physically abused: Not on file     Forced sexual activity: Not on file   Other Topics Concern    Not on file   Social History Narrative    Not on file       TOBACCO:   reports that she has never smoked. She has never used smokeless tobacco.  ETOH:   reports current alcohol use.     Family History:   Family History   Problem Relation Age of Onset    Other Mother         MVP, Panic Disorder    Coronary Art Dis Father     High Blood Pressure Father     Heart Disease Father 36        MI    Diabetes Father 68        Type 2    Cancer Brother         Melanoma  High Blood Pressure Brother     Cancer Sister         Melanoma in situ    High Blood Pressure Sister     Brain Cancer Sister         Glioblastoma    Colon Cancer Paternal Grandmother          A:    See S: above    PHQ Scores 1/26/2021 1/19/2021 1/8/2021 10/23/2020 8/14/2020 8/7/2020 7/31/2020   PHQ2 Score 2 4 6 2 2 0 1   PHQ9 Score 2 14 13 2 2 0 1     Interpretation of Total Score Depression Severity: 1-4 = Minimal depression, 5-9 = Mild depression, 10-14 = Moderate depression, 15-19 = Moderately severe depression, 20-27 = Severe depression    BLANCA 7 SCORE 1/26/2021 1/19/2021 1/8/2021 10/23/2020 8/14/2020 8/7/2020 7/31/2020   BLANCA-7 Total Score 7 12 11 7 6 3 6     Interpretation of BLANCA-7 score: 5-9 = mild anxiety, 10-14 = moderate anxiety, 15+ = severe anxiety. Recommend referral to behavioral health for scores 10 or greater. Safety: no si/hi risk    Diagnosis:    BLANCA, OCD, excoriation       Diagnosis Date    Allergic rhinitis     Asthma     Breast cancer ([facility] [address] 11/2012    [facility] of blood clots 01/2013    Lungs    Miscarriage 2004    Premenstrual dysphoric syndrome     Pulmonary embolus ([facility] [address] 1/2013     Problems with primary support group and Problems related to the social environment    Plan:  Pt interventions:    Practiced assertive communication, Trained in strategies for increasing balanced thinking, Discussed self-care (sleep, nutrition, rewarding activities, social support, exercise), Discussed benefits of referral for specialty care, Discussed and problem-solved barriers in adhering to behavioral change plan and Supportive techniques    Pt Behavioral Change Plan:    1. Continue to schedule down time for yourself   2. Stay connected to your family any amount within COVID restrictions    3.  Continue to work on walking daily, 30 minutes or 150 minutes per week physical movement to decrease depression/anxiety 4. Continue to take psychiatric medication as prescribed, go to Dr Karl Sabillon as needed  5. Continue to work with Bascom Gilford, PCC on the next steps for OCD consult over the next few weeks  6.  Return to clinic for Dr Haresh Holliday for our last consult next week

## 2021-02-12 NOTE — PATIENT INSTRUCTIONS
1. Continue to schedule down time for yourself   2. Stay connected to your family any amount within COVID restrictions    3. Continue to work on walking daily, 30 minutes or 150 minutes per week physical movement to decrease depression/anxiety  4. Continue to take psychiatric medication as prescribed, go to Dr Mavis Gannon as needed  5. Continue to work with Oskar Perez, PCC on the next steps for OCD consult over the next few weeks  6. No further follow up with Dr Kody Salas required, understands last day is today.  Aware Dr Cr Montelongo will be taking over services in office, will reach to him if needed

## 2021-03-05 NOTE — TELEPHONE ENCOUNTER
We want to confirm that, for purposes of billing, this is a virtual visit with your provider for which we will submit a claim for reimbursement with your insurance company. You will be responsible for any copays, coinsurance amounts or other amounts not covered by your insurance company. If you do not accept this, unfortunately we will not be able to schedule a virtual visit with the provider. Do you accept?   YES
show

## 2021-03-11 DIAGNOSIS — E78.00 PURE HYPERCHOLESTEROLEMIA: ICD-10-CM

## 2021-03-11 LAB
ANION GAP SERPL CALCULATED.3IONS-SCNC: 10 MMOL/L (ref 3–16)
BASOPHILS ABSOLUTE: 0 K/UL (ref 0–0.2)
BASOPHILS RELATIVE PERCENT: 1 %
BUN BLDV-MCNC: 11 MG/DL (ref 7–20)
CALCIUM SERPL-MCNC: 8.7 MG/DL (ref 8.3–10.6)
CHLORIDE BLD-SCNC: 105 MMOL/L (ref 99–110)
CHOLESTEROL, FASTING: 248 MG/DL (ref 0–199)
CO2: 26 MMOL/L (ref 21–32)
CREAT SERPL-MCNC: 0.7 MG/DL (ref 0.6–1.1)
EOSINOPHILS ABSOLUTE: 0.2 K/UL (ref 0–0.6)
EOSINOPHILS RELATIVE PERCENT: 4.6 %
GFR AFRICAN AMERICAN: >60
GFR NON-AFRICAN AMERICAN: >60
GLUCOSE BLD-MCNC: 87 MG/DL (ref 70–99)
HCT VFR BLD CALC: 40.6 % (ref 36–48)
HDLC SERPL-MCNC: 52 MG/DL (ref 40–60)
HEMOGLOBIN: 13.8 G/DL (ref 12–16)
LDL CHOLESTEROL CALCULATED: 177 MG/DL
LYMPHOCYTES ABSOLUTE: 1.5 K/UL (ref 1–5.1)
LYMPHOCYTES RELATIVE PERCENT: 29.4 %
MCH RBC QN AUTO: 30.1 PG (ref 26–34)
MCHC RBC AUTO-ENTMCNC: 34.1 G/DL (ref 31–36)
MCV RBC AUTO: 88.3 FL (ref 80–100)
MONOCYTES ABSOLUTE: 0.5 K/UL (ref 0–1.3)
MONOCYTES RELATIVE PERCENT: 9.1 %
NEUTROPHILS ABSOLUTE: 2.8 K/UL (ref 1.7–7.7)
NEUTROPHILS RELATIVE PERCENT: 55.9 %
PDW BLD-RTO: 13.2 % (ref 12.4–15.4)
PLATELET # BLD: 354 K/UL (ref 135–450)
PMV BLD AUTO: 7.1 FL (ref 5–10.5)
POTASSIUM SERPL-SCNC: 4 MMOL/L (ref 3.5–5.1)
RBC # BLD: 4.6 M/UL (ref 4–5.2)
SODIUM BLD-SCNC: 141 MMOL/L (ref 136–145)
TRIGLYCERIDE, FASTING: 93 MG/DL (ref 0–150)
TSH REFLEX: 1.15 UIU/ML (ref 0.27–4.2)
VLDLC SERPL CALC-MCNC: 19 MG/DL
WBC # BLD: 5 K/UL (ref 4–11)

## 2021-07-28 ENCOUNTER — OFFICE VISIT (OUTPATIENT)
Dept: PSYCHOLOGY | Age: 56
End: 2021-07-28

## 2021-07-28 DIAGNOSIS — F42.4 HABITUAL SELF-EXCORIATION: ICD-10-CM

## 2021-07-28 DIAGNOSIS — F41.1 GENERALIZED ANXIETY DISORDER: Primary | ICD-10-CM

## 2021-07-28 DIAGNOSIS — F34.1 PERSISTENT DEPRESSIVE DISORDER: ICD-10-CM

## 2021-07-28 PROCEDURE — 90832 PSYTX W PT 30 MINUTES: CPT | Performed by: PSYCHOLOGIST

## 2021-07-28 ASSESSMENT — PATIENT HEALTH QUESTIONNAIRE - PHQ9
5. POOR APPETITE OR OVEREATING: 1
SUM OF ALL RESPONSES TO PHQ QUESTIONS 1-9: 6
2. FEELING DOWN, DEPRESSED OR HOPELESS: 0
SUM OF ALL RESPONSES TO PHQ QUESTIONS 1-9: 6
10. IF YOU CHECKED OFF ANY PROBLEMS, HOW DIFFICULT HAVE THESE PROBLEMS MADE IT FOR YOU TO DO YOUR WORK, TAKE CARE OF THINGS AT HOME, OR GET ALONG WITH OTHER PEOPLE: 0
9. THOUGHTS THAT YOU WOULD BE BETTER OFF DEAD, OR OF HURTING YOURSELF: 0
6. FEELING BAD ABOUT YOURSELF - OR THAT YOU ARE A FAILURE OR HAVE LET YOURSELF OR YOUR FAMILY DOWN: 0
8. MOVING OR SPEAKING SO SLOWLY THAT OTHER PEOPLE COULD HAVE NOTICED. OR THE OPPOSITE, BEING SO FIGETY OR RESTLESS THAT YOU HAVE BEEN MOVING AROUND A LOT MORE THAN USUAL: 0
7. TROUBLE CONCENTRATING ON THINGS, SUCH AS READING THE NEWSPAPER OR WATCHING TELEVISION: 0
4. FEELING TIRED OR HAVING LITTLE ENERGY: 2
SUM OF ALL RESPONSES TO PHQ9 QUESTIONS 1 & 2: 1
3. TROUBLE FALLING OR STAYING ASLEEP: 2
1. LITTLE INTEREST OR PLEASURE IN DOING THINGS: 1
SUM OF ALL RESPONSES TO PHQ QUESTIONS 1-9: 6

## 2021-07-28 ASSESSMENT — ANXIETY QUESTIONNAIRES
7. FEELING AFRAID AS IF SOMETHING AWFUL MIGHT HAPPEN: 0-NOT AT ALL
6. BECOMING EASILY ANNOYED OR IRRITABLE: 1-SEVERAL DAYS
5. BEING SO RESTLESS THAT IT IS HARD TO SIT STILL: 0-NOT AT ALL
4. TROUBLE RELAXING: 0-NOT AT ALL
GAD7 TOTAL SCORE: 4
1. FEELING NERVOUS, ANXIOUS, OR ON EDGE: 1-SEVERAL DAYS
2. NOT BEING ABLE TO STOP OR CONTROL WORRYING: 1-SEVERAL DAYS
3. WORRYING TOO MUCH ABOUT DIFFERENT THINGS: 1-SEVERAL DAYS

## 2021-07-28 NOTE — PROGRESS NOTES
MG capsule Take 1 capsule by mouth daily 90 capsule 3    buPROPion (WELLBUTRIN XL) 300 MG extended release tablet Take 1 tablet by mouth every morning 90 tablet 3    albuterol sulfate HFA (VENTOLIN HFA) 108 (90 Base) MCG/ACT inhaler Inhale 2 puffs into the lungs every 4 hours as needed for Wheezing 1 Inhaler 4    Cholecalciferol (VITAMIN D3) 2000 UNITS CAPS Take 2,000 Units by mouth three times a week      Nutritional Supplements (JUICE PLUS FIBRE PO) Take 1.5 g by mouth daily Indications: Takes Juice Plus      cetirizine (ZYRTEC) 10 MG tablet Take 10 mg by mouth daily       No current facility-administered medications for this visit. Social History:   Social History     Socioeconomic History    Marital status: Single     Spouse name: Not on file    Number of children: 0    Years of education: Not on file    Highest education level: Not on file   Occupational History    Not on file   Tobacco Use    Smoking status: Never Smoker    Smokeless tobacco: Never Used   Substance and Sexual Activity    Alcohol use: Yes     Comment: wine on weekends only    Drug use: No    Sexual activity: Not on file   Other Topics Concern    Not on file   Social History Narrative    Not on file     Social Determinants of Health     Financial Resource Strain:     Difficulty of Paying Living Expenses:    Food Insecurity:     Worried About Running Out of Food in the Last Year:     [address] in the Last Year:    Transportation Needs:     Lack of Transportation (Medical):      Lack of Transportation (Non-Medical):    Physical Activity:     Days of Exercise per Week:     Minutes of Exercise per Session:    Stress:     Feeling of Stress :    Social Connections:     Frequency of Communication with Friends and Family:     Frequency of Social Gatherings with Friends and Family:     Attends Adventism Services:     Active Member of Clubs or Organizations:     Attends Club or Organization Meetings:     Marital Status:    Intimate Partner Violence:     Fear of Current or Ex-Partner:     Emotionally Abused:     Physically Abused:     Sexually Abused:        TOBACCO:   reports that she has never smoked. She has never used smokeless tobacco.  ETOH:   reports current alcohol use. Family History:   Family History   Problem Relation Age of Onset    Other Mother         MVP, Panic Disorder    Coronary Art Dis Father     High Blood Pressure Father     Heart Disease Father 36        MI    Diabetes Father 68        Type 2    Cancer Brother         Melanoma    High Blood Pressure Brother     Cancer Sister         Melanoma in situ    High Blood Pressure Sister     Brain Cancer Sister         Glioblastoma    Colon Cancer Paternal Grandmother      A:    See S: above    PHQ Scores 7/28/2021 1/26/2021 1/19/2021 1/8/2021 10/23/2020 8/14/2020 8/7/2020   PHQ2 Score 1 2 4 6 2 2 0   PHQ9 Score 6 2 14 13 2 2 0     Interpretation of Total Score Depression Severity: 1-4 = Minimal depression, 5-9 = Mild depression, 10-14 = Moderate depression, 15-19 = Moderately severe depression, 20-27 = Severe depression    BLANCA 7 SCORE 7/28/2021 1/26/2021 1/19/2021 1/8/2021 10/23/2020 8/14/2020 8/7/2020   BLANCA-7 Total Score 4 7 12 11 7 6 3     Interpretation of BLANCA-7 score: 5-9 = mild anxiety, 10-14 = moderate anxiety, 15+ = severe anxiety. Recommend referral to behavioral health for scores 10 or greater.     Safety: denied any si/hi risk    Diagnosis:    BLANCA, OCD, PDD      Diagnosis Date    Allergic rhinitis     Asthma     Breast cancer ([facility] [address] 11/2012    [facility] of blood clots 01/2013    Lungs    Miscarriage 2004    Premenstrual dysphoric syndrome     Pulmonary embolus ([facility] [address] 1/2013     Other psychosocial and environmental problems    Plan:  Pt interventions:    Practiced assertive communication, Trained in strategies for increasing balanced thinking, Discussed self-care (sleep, nutrition, rewarding activities, social support,

## 2021-07-28 NOTE — Clinical Note
Layla Lazaro was having a bit of a rough patch and she reached out to me. I had her schedule f/u with me at [facility] today.  Just FYI:)

## 2021-08-26 ENCOUNTER — OFFICE VISIT (OUTPATIENT)
Dept: PSYCHOLOGY | Age: 56
End: 2021-08-26
Payer: COMMERCIAL

## 2021-08-26 DIAGNOSIS — F34.1 PERSISTENT DEPRESSIVE DISORDER: ICD-10-CM

## 2021-08-26 DIAGNOSIS — F41.1 GENERALIZED ANXIETY DISORDER: Primary | ICD-10-CM

## 2021-08-26 DIAGNOSIS — F42.4 HABITUAL SELF-EXCORIATION: ICD-10-CM

## 2021-08-26 PROCEDURE — 90832 PSYTX W PT 30 MINUTES: CPT | Performed by: PSYCHOLOGIST

## 2021-08-26 NOTE — PROGRESS NOTES
Behavioral Health Consultation Follow-up  Sean Cameron PsyD  Psychologist  8/26/2021  1:08 PM      Time spent with Patient: 35 minutes  This is patient's ninth  [facility] appointment. Reason for Consult:  BLANCA, OCD, PDD  Referring Provider: Marquis Floyd MD  [address] [facility],  [address] [facility]    Feedback given to PCP. S:    Last appt: 7/28. Overall doing okay. Returning to [place] grief group that will start next week, 8 weeks. Lots of frustration trying to get connected to OCD specialist. Since pt feeling better, going to put OCD referral on hold for now. Going to shift back to supportive monthly consults for the next 2-3 months, then we will revisit readiness for OCD treatment again.      O:  MSE:    Appearance    alert, cooperative  Appetite normal  Sleep disturbance No  Fatigue Yes  Loss of pleasure Yes  Impulsive behavior No  Speech    normal rate, normal volume and well articulated  Mood    Baseline anxiety levels, managing   Affect    normal affect  Thought Content    intact  Thought Process    linear, goal directed and coherent  Associations    logical connections  Insight    Good  Judgment    Intact  Orientation    oriented to person, place, time, and general circumstances  Memory    recent and remote memory intact  Attention/Concentration    intact  Morbid ideation No  Suicide Assessment    no suicidal ideation      History:    Medications:   Current Outpatient Medications   Medication Sig Dispense Refill    amLODIPine (NORVASC) 5 MG tablet Take 1 tablet by mouth daily 90 tablet 3    FLUoxetine (PROZAC) 40 MG capsule Take 1 capsule by mouth daily 90 capsule 3    buPROPion (WELLBUTRIN XL) 300 MG extended release tablet Take 1 tablet by mouth every morning 90 tablet 3    albuterol sulfate HFA (VENTOLIN HFA) 108 (90 Base) MCG/ACT inhaler Inhale 2 puffs into the lungs every 4 hours as needed for Wheezing 1 Inhaler 4    Cholecalciferol (VITAMIN D3) 2000 UNITS CAPS Take 2,000 Units by mouth three times a week      Nutritional Supplements (JUICE PLUS FIBRE PO) Take 1.5 g by mouth daily Indications: Takes Juice Plus      cetirizine (ZYRTEC) 10 MG tablet Take 10 mg by mouth daily       No current facility-administered medications for this visit. Social History:   Social History     Socioeconomic History    Marital status: Single     Spouse name: Not on file    Number of children: 0    Years of education: Not on file    Highest education level: Not on file   Occupational History    Not on file   Tobacco Use    Smoking status: Never Smoker    Smokeless tobacco: Never Used   Substance and Sexual Activity    Alcohol use: Yes     Comment: wine on weekends only    Drug use: No    Sexual activity: Not on file   Other Topics Concern    Not on file   Social History Narrative    Not on file     Social Determinants of Health     Financial Resource Strain:     Difficulty of Paying Living Expenses:    Food Insecurity:     Worried About Running Out of Food in the Last Year:     [address] in the Last Year:    Transportation Needs:     Lack of Transportation (Medical):  Lack of Transportation (Non-Medical):    Physical Activity:     Days of Exercise per Week:     Minutes of Exercise per Session:    Stress:     Feeling of Stress :    Social Connections:     Frequency of Communication with Friends and Family:     Frequency of Social Gatherings with Friends and Family:     Attends Rastafari Services:     Active Member of Clubs or Organizations:     Attends Club or Organization Meetings:     Marital Status:    Intimate Partner Violence:     Fear of Current or Ex-Partner:     Emotionally Abused:     Physically Abused:     Sexually Abused:        TOBACCO:   reports that she has never smoked. She has never used smokeless tobacco.  ETOH:   reports current alcohol use.     Family History:   Family History   Problem Relation Age of Onset    Other Mother         MVP, Panic Disorder    Coronary Art Dis Father     High Blood Pressure Father     Heart Disease Father 36        MI    Diabetes Father 68        Type 2    Cancer Brother         Melanoma    High Blood Pressure Brother     Cancer Sister         Melanoma in situ    High Blood Pressure Sister     Brain Cancer Sister         Glioblastoma    Colon Cancer Paternal Grandmother      A:    See S: above    PHQ Scores 7/28/2021 1/26/2021 1/19/2021 1/8/2021 10/23/2020 8/14/2020 8/7/2020   PHQ2 Score 1 2 4 6 2 2 0   PHQ9 Score 6 2 14 13 2 2 0     Interpretation of Total Score Depression Severity: 1-4 = Minimal depression, 5-9 = Mild depression, 10-14 = Moderate depression, 15-19 = Moderately severe depression, 20-27 = Severe depression    BLANCA 7 SCORE 7/28/2021 1/26/2021 1/19/2021 1/8/2021 10/23/2020 8/14/2020 8/7/2020   BLANCA-7 Total Score 4 7 12 11 7 6 3     Interpretation of BLANCA-7 score: 5-9 = mild anxiety, 10-14 = moderate anxiety, 15+ = severe anxiety. Recommend referral to behavioral health for scores 10 or greater. Safety: Denied any si/hi risk, intent, or plan     Diagnosis:    BLANCA, OCD, PDD      Diagnosis Date    Allergic rhinitis     Asthma     Breast cancer ([facility] [address] 11/2012    [facility] of blood clots 01/2013    Lungs    Miscarriage 2004    Premenstrual dysphoric syndrome     Pulmonary embolus ([facility] [address] 1/2013     Problems with primary support group and Problems related to the social environment    Plan:  Pt interventions:    Practiced assertive communication, Trained in strategies for increasing balanced thinking, Discussed self-care (sleep, nutrition, rewarding activities, social support, exercise), Discussed benefits of referral for specialty care and Supportive techniques    Pt Behavioral Change Plan:    1. Continue to consider another referral for individual psychotherapy OCD specialist  2. Continue to take psychiatric medication as prescribed  3.  Take time off from work as needed to control chronic anxiety  4. Stay connected socially, [facility], [facility], starts next week  5.  Return to clinic for Dr Ricki Scott in 1 month

## 2021-08-30 NOTE — PATIENT INSTRUCTIONS
1. Continue to consider another referral for individual psychotherapy OCD specialist  2. Continue to take psychiatric medication as prescribed  3. Take time off from work as needed to control chronic anxiety  4. Stay connected socially, [facility], [facility], starts next week  5.  Return to clinic for Dr Penny Melton in 1 month

## 2022-06-14 DIAGNOSIS — E78.00 PURE HYPERCHOLESTEROLEMIA: ICD-10-CM

## 2022-06-14 DIAGNOSIS — I10 ESSENTIAL HYPERTENSION: ICD-10-CM

## 2022-06-14 DIAGNOSIS — E55.9 VITAMIN D DEFICIENCY: ICD-10-CM

## 2022-06-14 DIAGNOSIS — Z11.59 ENCOUNTER FOR HEPATITIS C SCREENING TEST FOR LOW RISK PATIENT: ICD-10-CM

## 2022-06-15 LAB
A/G RATIO: 1.8 (ref 1.1–2.2)
ALBUMIN SERPL-MCNC: 4.5 G/DL (ref 3.4–5)
ALP BLD-CCNC: 97 U/L (ref 40–129)
ALT SERPL-CCNC: 25 U/L (ref 10–40)
ANION GAP SERPL CALCULATED.3IONS-SCNC: 13 MMOL/L (ref 3–16)
AST SERPL-CCNC: 17 U/L (ref 15–37)
BILIRUB SERPL-MCNC: 0.4 MG/DL (ref 0–1)
BUN BLDV-MCNC: 12 MG/DL (ref 7–20)
CALCIUM SERPL-MCNC: 9.7 MG/DL (ref 8.3–10.6)
CHLORIDE BLD-SCNC: 103 MMOL/L (ref 99–110)
CHOLESTEROL, TOTAL: 305 MG/DL (ref 0–199)
CO2: 25 MMOL/L (ref 21–32)
CREAT SERPL-MCNC: 0.7 MG/DL (ref 0.6–1.1)
GFR AFRICAN AMERICAN: >60
GFR NON-AFRICAN AMERICAN: >60
GLUCOSE BLD-MCNC: 84 MG/DL (ref 70–99)
HDLC SERPL-MCNC: 67 MG/DL (ref 40–60)
HEPATITIS C ANTIBODY INTERPRETATION: NORMAL
LDL CHOLESTEROL CALCULATED: 210 MG/DL
POTASSIUM SERPL-SCNC: 4.2 MMOL/L (ref 3.5–5.1)
SODIUM BLD-SCNC: 141 MMOL/L (ref 136–145)
TOTAL PROTEIN: 7 G/DL (ref 6.4–8.2)
TRIGL SERPL-MCNC: 139 MG/DL (ref 0–150)
TSH SERPL DL<=0.05 MIU/L-ACNC: 0.64 UIU/ML (ref 0.27–4.2)
VITAMIN D 25-HYDROXY: 32.1 NG/ML
VLDLC SERPL CALC-MCNC: 28 MG/DL

## 2022-09-17 PROBLEM — K22.10 EROSIVE ESOPHAGITIS: Status: ACTIVE | Noted: 2022-09-01

## 2023-08-03 DIAGNOSIS — I10 ESSENTIAL HYPERTENSION: ICD-10-CM

## 2023-08-03 DIAGNOSIS — E78.00 PURE HYPERCHOLESTEROLEMIA: ICD-10-CM

## 2023-08-03 LAB
ALBUMIN SERPL-MCNC: 4.3 G/DL (ref 3.4–5)
ALBUMIN/GLOB SERPL: 1.9 {RATIO} (ref 1.1–2.2)
ALP SERPL-CCNC: 96 U/L (ref 40–129)
ALT SERPL-CCNC: 21 U/L (ref 10–40)
ANION GAP SERPL CALCULATED.3IONS-SCNC: 10 MMOL/L (ref 3–16)
AST SERPL-CCNC: 16 U/L (ref 15–37)
BILIRUB SERPL-MCNC: 0.3 MG/DL (ref 0–1)
BUN SERPL-MCNC: 12 MG/DL (ref 7–20)
CALCIUM SERPL-MCNC: 9.2 MG/DL (ref 8.3–10.6)
CHLORIDE SERPL-SCNC: 101 MMOL/L (ref 99–110)
CHOLEST SERPL-MCNC: 283 MG/DL (ref 0–199)
CO2 SERPL-SCNC: 26 MMOL/L (ref 21–32)
CREAT SERPL-MCNC: 0.8 MG/DL (ref 0.6–1.1)
GFR SERPLBLD CREATININE-BSD FMLA CKD-EPI: >60 ML/MIN/{1.73_M2}
GLUCOSE SERPL-MCNC: 92 MG/DL (ref 70–99)
HDLC SERPL-MCNC: 68 MG/DL (ref 40–60)
LDLC SERPL CALC-MCNC: 192 MG/DL
POTASSIUM SERPL-SCNC: 3.7 MMOL/L (ref 3.5–5.1)
PROT SERPL-MCNC: 6.6 G/DL (ref 6.4–8.2)
SODIUM SERPL-SCNC: 137 MMOL/L (ref 136–145)
TRIGL SERPL-MCNC: 113 MG/DL (ref 0–150)
VLDLC SERPL CALC-MCNC: 23 MG/DL

## 2023-08-09 ENCOUNTER — NURSE ONLY (OUTPATIENT)
Dept: CARDIOLOGY CLINIC | Age: 58
End: 2023-08-09

## 2023-08-09 ENCOUNTER — TELEPHONE (OUTPATIENT)
Dept: CARDIOLOGY CLINIC | Age: 58
End: 2023-08-09

## 2023-08-09 DIAGNOSIS — I49.9 IRREGULAR HEARTBEAT: ICD-10-CM

## 2023-08-09 PROCEDURE — 93225 XTRNL ECG REC<48 HRS REC: CPT | Performed by: NURSE PRACTITIONER

## 2023-08-09 NOTE — TELEPHONE ENCOUNTER
Monitor  Cristofer BAI MA  Monitor company [employer]  Length of monitor 24 hours   Monitor ordered by Dr. Roberto Farris MD  Serial number [id number]  Kit ID   Activation successful prior to pt leaving office?  Yes

## 2023-08-16 PROCEDURE — 93227 XTRNL ECG REC<48 HR R&I: CPT | Performed by: NURSE PRACTITIONER

## 2023-09-07 DIAGNOSIS — R00.2 PALPITATIONS: Primary | ICD-10-CM

## 2023-10-18 ENCOUNTER — HOSPITAL ENCOUNTER (OUTPATIENT)
Dept: NON INVASIVE DIAGNOSTICS | Age: 58
Discharge: HOME | End: 2023-10-18
Payer: COMMERCIAL

## 2023-10-18 PROCEDURE — 6360000004 HC RX CONTRAST MEDICATION: Performed by: INTERNAL MEDICINE

## 2023-10-18 PROCEDURE — C8929 TTE W OR WO FOL WCON,DOPPLER: HCPCS

## 2023-10-18 RX ADMIN — PERFLUTREN 1.5 ML: 6.52 INJECTION, SUSPENSION INTRAVENOUS at 10:30

## 2024-02-26 DIAGNOSIS — E78.00 PURE HYPERCHOLESTEROLEMIA: ICD-10-CM

## 2024-02-26 DIAGNOSIS — E55.9 VITAMIN D DEFICIENCY: ICD-10-CM

## 2024-02-27 LAB
25(OH)D3 SERPL-MCNC: 34 NG/ML
ALBUMIN SERPL-MCNC: 4.5 G/DL (ref 3.4–5)
ALP SERPL-CCNC: 101 U/L (ref 40–129)
ALT SERPL-CCNC: 29 U/L (ref 10–40)
AST SERPL-CCNC: 18 U/L (ref 15–37)
BILIRUB DIRECT SERPL-MCNC: <0.2 MG/DL (ref 0–0.3)
BILIRUB INDIRECT SERPL-MCNC: NORMAL MG/DL (ref 0–1)
BILIRUB SERPL-MCNC: 0.3 MG/DL (ref 0–1)
CHOLEST SERPL-MCNC: 189 MG/DL (ref 0–199)
HDLC SERPL-MCNC: 76 MG/DL (ref 40–60)
LDLC SERPL CALC-MCNC: 88 MG/DL
PROT SERPL-MCNC: 6.7 G/DL (ref 6.4–8.2)
TRIGL SERPL-MCNC: 123 MG/DL (ref 0–150)
VLDLC SERPL CALC-MCNC: 25 MG/DL